# Patient Record
Sex: MALE | Race: WHITE | NOT HISPANIC OR LATINO | URBAN - METROPOLITAN AREA
[De-identification: names, ages, dates, MRNs, and addresses within clinical notes are randomized per-mention and may not be internally consistent; named-entity substitution may affect disease eponyms.]

---

## 2023-05-22 ENCOUNTER — INPATIENT (INPATIENT)
Facility: HOSPITAL | Age: 58
LOS: 1 days | Discharge: HOME | DRG: 552 | End: 2023-05-24
Attending: STUDENT IN AN ORGANIZED HEALTH CARE EDUCATION/TRAINING PROGRAM | Admitting: STUDENT IN AN ORGANIZED HEALTH CARE EDUCATION/TRAINING PROGRAM
Payer: COMMERCIAL

## 2023-05-22 VITALS
WEIGHT: 244.93 LBS | TEMPERATURE: 98 F | OXYGEN SATURATION: 94 % | DIASTOLIC BLOOD PRESSURE: 88 MMHG | HEART RATE: 117 BPM | HEIGHT: 70 IN | RESPIRATION RATE: 18 BRPM | SYSTOLIC BLOOD PRESSURE: 114 MMHG

## 2023-05-22 LAB
ALBUMIN SERPL ELPH-MCNC: 4.6 G/DL — SIGNIFICANT CHANGE UP (ref 3.3–5)
ALP SERPL-CCNC: 52 U/L — SIGNIFICANT CHANGE UP (ref 40–120)
ALT FLD-CCNC: 52 U/L — SIGNIFICANT CHANGE UP (ref 12–78)
ANION GAP SERPL CALC-SCNC: 8 MMOL/L — SIGNIFICANT CHANGE UP (ref 5–17)
AST SERPL-CCNC: 61 U/L — HIGH (ref 15–37)
BASOPHILS # BLD AUTO: 0.1 K/UL — SIGNIFICANT CHANGE UP (ref 0–0.2)
BASOPHILS NFR BLD AUTO: 0.8 % — SIGNIFICANT CHANGE UP (ref 0–2)
BILIRUB SERPL-MCNC: 0.7 MG/DL — SIGNIFICANT CHANGE UP (ref 0.2–1.2)
BUN SERPL-MCNC: 30 MG/DL — HIGH (ref 7–23)
CALCIUM SERPL-MCNC: 9.8 MG/DL — SIGNIFICANT CHANGE UP (ref 8.5–10.1)
CHLORIDE SERPL-SCNC: 102 MMOL/L — SIGNIFICANT CHANGE UP (ref 96–108)
CO2 SERPL-SCNC: 25 MMOL/L — SIGNIFICANT CHANGE UP (ref 22–31)
CREAT SERPL-MCNC: 1.2 MG/DL — SIGNIFICANT CHANGE UP (ref 0.5–1.3)
EGFR: 70 ML/MIN/1.73M2 — SIGNIFICANT CHANGE UP
EOSINOPHIL # BLD AUTO: 0.13 K/UL — SIGNIFICANT CHANGE UP (ref 0–0.5)
EOSINOPHIL NFR BLD AUTO: 1 % — SIGNIFICANT CHANGE UP (ref 0–6)
GLUCOSE SERPL-MCNC: 112 MG/DL — HIGH (ref 70–99)
HCT VFR BLD CALC: 40.9 % — SIGNIFICANT CHANGE UP (ref 39–50)
HGB BLD-MCNC: 13.9 G/DL — SIGNIFICANT CHANGE UP (ref 13–17)
IMM GRANULOCYTES NFR BLD AUTO: 0.2 % — SIGNIFICANT CHANGE UP (ref 0–0.9)
LYMPHOCYTES # BLD AUTO: 22.7 % — SIGNIFICANT CHANGE UP (ref 13–44)
LYMPHOCYTES # BLD AUTO: 3.02 K/UL — SIGNIFICANT CHANGE UP (ref 1–3.3)
MCHC RBC-ENTMCNC: 26.6 PG — LOW (ref 27–34)
MCHC RBC-ENTMCNC: 34 GM/DL — SIGNIFICANT CHANGE UP (ref 32–36)
MCV RBC AUTO: 78.2 FL — LOW (ref 80–100)
MONOCYTES # BLD AUTO: 0.86 K/UL — SIGNIFICANT CHANGE UP (ref 0–0.9)
MONOCYTES NFR BLD AUTO: 6.5 % — SIGNIFICANT CHANGE UP (ref 2–14)
NEUTROPHILS # BLD AUTO: 9.16 K/UL — HIGH (ref 1.8–7.4)
NEUTROPHILS NFR BLD AUTO: 68.8 % — SIGNIFICANT CHANGE UP (ref 43–77)
NRBC # BLD: 0 /100 WBCS — SIGNIFICANT CHANGE UP (ref 0–0)
PLATELET # BLD AUTO: 357 K/UL — SIGNIFICANT CHANGE UP (ref 150–400)
POTASSIUM SERPL-MCNC: 4.7 MMOL/L — SIGNIFICANT CHANGE UP (ref 3.5–5.3)
POTASSIUM SERPL-SCNC: 4.7 MMOL/L — SIGNIFICANT CHANGE UP (ref 3.5–5.3)
PROT SERPL-MCNC: 8.3 G/DL — SIGNIFICANT CHANGE UP (ref 6–8.3)
RBC # BLD: 5.23 M/UL — SIGNIFICANT CHANGE UP (ref 4.2–5.8)
RBC # FLD: 13.9 % — SIGNIFICANT CHANGE UP (ref 10.3–14.5)
SODIUM SERPL-SCNC: 135 MMOL/L — SIGNIFICANT CHANGE UP (ref 135–145)
WBC # BLD: 13.3 K/UL — HIGH (ref 3.8–10.5)
WBC # FLD AUTO: 13.3 K/UL — HIGH (ref 3.8–10.5)

## 2023-05-22 PROCEDURE — 99285 EMERGENCY DEPT VISIT HI MDM: CPT

## 2023-05-22 RX ORDER — MORPHINE SULFATE 50 MG/1
4 CAPSULE, EXTENDED RELEASE ORAL ONCE
Refills: 0 | Status: DISCONTINUED | OUTPATIENT
Start: 2023-05-22 | End: 2023-05-22

## 2023-05-22 RX ORDER — OXYCODONE HYDROCHLORIDE 5 MG/1
10 TABLET ORAL ONCE
Refills: 0 | Status: DISCONTINUED | OUTPATIENT
Start: 2023-05-22 | End: 2023-05-22

## 2023-05-22 RX ORDER — LIDOCAINE 4 G/100G
1 CREAM TOPICAL ONCE
Refills: 0 | Status: COMPLETED | OUTPATIENT
Start: 2023-05-22 | End: 2023-05-22

## 2023-05-22 RX ORDER — DEXAMETHASONE 0.5 MG/5ML
6 ELIXIR ORAL ONCE
Refills: 0 | Status: COMPLETED | OUTPATIENT
Start: 2023-05-22 | End: 2023-05-22

## 2023-05-22 RX ORDER — KETOROLAC TROMETHAMINE 30 MG/ML
30 SYRINGE (ML) INJECTION ONCE
Refills: 0 | Status: DISCONTINUED | OUTPATIENT
Start: 2023-05-22 | End: 2023-05-22

## 2023-05-22 RX ADMIN — MORPHINE SULFATE 4 MILLIGRAM(S): 50 CAPSULE, EXTENDED RELEASE ORAL at 22:43

## 2023-05-22 RX ADMIN — Medication 30 MILLIGRAM(S): at 22:42

## 2023-05-22 RX ADMIN — Medication 6 MILLIGRAM(S): at 22:42

## 2023-05-22 RX ADMIN — LIDOCAINE 1 PATCH: 4 CREAM TOPICAL at 20:40

## 2023-05-22 RX ADMIN — OXYCODONE HYDROCHLORIDE 10 MILLIGRAM(S): 5 TABLET ORAL at 20:39

## 2023-05-22 RX ADMIN — MORPHINE SULFATE 4 MILLIGRAM(S): 50 CAPSULE, EXTENDED RELEASE ORAL at 23:57

## 2023-05-22 NOTE — ED ADULT NURSE NOTE - ED COMFORT CARE
[facility]AN HOSPITALIST  History and Physical     Ty Holts Patient Status:  Observation    1951 MRN [id number]   [facility] 7NE-A Attending Chao Crowder MD   Hosp Day # 0 PCP Myriam Ash MD     Chief Complaint: dizziness • ENDOSCOPIC RETROGRADE CHOLANGIOPANCREATOGRAPHY (ERCP) N/A 8/8/2016    Performed by Alejandrina Walker MD at [address] [place] [address] (ERCP) N/A 7/25/2016    Performed by Alejandrina Walker MD at [facility] ENDOSCOPY   • E facility-administered medications on file prior to encounter.    ferrous sulfate 325 (65 FE) MG Oral Tab EC, Take 325 mg by mouth daily with breakfast., Disp: , Rfl:   pancrelipase, Lip-Prot-Amyl, 47709-64513 units Oral Cap DR Particles, Take 20,000 Units b deficits. CNII-XII grossly intact. Musculoskeletal: Moves all extremities. Extremities: No edema or cyanosis. Integument: No rashes or lesions. Psychiatric: Appropriate mood and affect.       Diagnostic Data:      Labs:  Recent Labs   Lab 11/12/19  125 Patient informed

## 2023-05-22 NOTE — ED PROVIDER NOTE - CLINICAL SUMMARY MEDICAL DECISION MAKING FREE TEXT BOX
58-year-old male with history of herniated disc L1-L5 history of spinal surgery seen and followed by spinal surgeon as well as pain management doctor Dr. Preston presents with worsening pain x2 weeks status post gardening.  Patient was at MRI office preparing for epidural injection unable to get MRI secondary to worsening pain.  Patient here in the ER lying on his right side complaining of right-sided buttock pain radiating down past the knee with tingling to the lateral aspect of his leg denies numbness bowel or urinary incontinence fever abdominal pain weakness.    On exam patient in no apparent distress only on movement patient with moderate distress, lungs clear to auscultation bilateral, cardiac tachycardic, abdomen soft nontender nondistended, back no midline spinal tenderness, positive tenderness to right buttock, unable to lift right leg secondary to pain, no sensory deficit,    Patient's clinical exam and history not consistent with spinal cord compression, patient with worsening disc pathology will treat for symptoms  Will give patient pain medication and reassess Will attempt to speak to Dr. Preston 58-year-old male with history of herniated disc L1-L5 history of spinal surgery seen and followed by spinal surgeon as well as pain management doctor Dr. Preston presents with worsening pain x2 weeks status post gardening.  Patient was at MRI office preparing for epidural injection unable to get MRI secondary to worsening pain.  Patient here in the ER lying on his right side complaining of right-sided buttock pain radiating down past the knee with tingling to the lateral aspect of his leg denies numbness bowel or urinary incontinence fever abdominal pain weakness.    On exam patient in no apparent distress only on movement patient with moderate distress, lungs clear to auscultation bilateral, cardiac tachycardic, abdomen soft nontender nondistended, back no midline spinal tenderness, positive tenderness to right buttock, unable to lift right leg secondary to pain, no sensory deficit,    Patient's clinical exam and history not consistent with spinal cord compression, patient with worsening disc pathology will treat for symptoms  Will give patient pain medication and reassess

## 2023-05-22 NOTE — ED ADULT TRIAGE NOTE - GLASGOW COMA SCALE: SCORE, MLM
Findings discussed with patient in detail. Pt will closely monitor symptoms for any change and understands the importance of prompt outpatient follow up and will return if worse.     15

## 2023-05-22 NOTE — ED ADULT NURSE NOTE - OBJECTIVE STATEMENT
Patient is a 59yo male Patient A/Ox4 with clear speech. BIBA for severe lower back pain. Hx of herniated discs, was picked up from imaging getting MRI of his back. Pain began when he was coming off the MRI table.

## 2023-05-22 NOTE — ED ADULT NURSE NOTE - NSFALLRISKINTERV_ED_ALL_ED
Assistance OOB with selected safe patient handling equipment if applicable/Assistance with ambulation/Communicate fall risk and risk factors to all staff, patient, and family/Monitor gait and stability/Provide visual cue: yellow wristband, yellow gown, etc/Reinforce activity limits and safety measures with patient and family/Call bell, personal items and telephone in reach/Instruct patient to call for assistance before getting out of bed/chair/stretcher/Non-slip footwear applied when patient is off stretcher/Withams to call system/Physically safe environment - no spills, clutter or unnecessary equipment/Purposeful Proactive Rounding/Room/bathroom lighting operational, light cord in reach

## 2023-05-22 NOTE — ED ADULT NURSE NOTE - NS ED NURSE LEVEL OF CONSCIOUSNESS MENTAL STATUS
Okay to use over-the-counter long-acting antihistamines or ibuprofen to decrease inflammation and redness.    Continue to monitor symptoms.  If you have any significant worsening, changes or concerns return at any time.    I hope that the rest of your bhavani year goes well!!!   Awake/Alert

## 2023-05-22 NOTE — ED ADULT TRIAGE NOTE - CHIEF COMPLAINT QUOTE
Patient A/Ox4 with clear speech. BIBA for severe lower back pain. Hx of herniated discs, was picked up from imaging getting MRI of his back. Pain began when he was coming off the MRI table.

## 2023-05-22 NOTE — ED PROVIDER NOTE - OBJECTIVE STATEMENT
58-year-old male with history of diabetes, hypertension, hyperlipidemia, and herniated disc, presents from the MRI office with excruciating back pain radiating down to right lower extremity past his knee.  Patient has been on gabapentin for several years for herniated disc past 2 weeks patient has had worsening pain since doing some gardening.  Patient was set to get MRI this afternoon for epidural placement by his pain management doctor Dr. Preston.  Patient unable to get MRI because of the excruciating pain, seen by his doctor at the MRI office.  Patient has been given amitriptyline prescription and also was told by his pain management doctor that he should get some oxycodone.  Patient sent here to [facility].  Patient denies abdominal pain, fever, chest pain, shortness of breath, patient says he has tongue tingling down his right lower extremity denies numbness in the prior perineal area in his extremities.  Patient also denies bowel and urinary incontinence.

## 2023-05-23 DIAGNOSIS — E78.5 HYPERLIPIDEMIA, UNSPECIFIED: ICD-10-CM

## 2023-05-23 DIAGNOSIS — I10 ESSENTIAL (PRIMARY) HYPERTENSION: ICD-10-CM

## 2023-05-23 DIAGNOSIS — M54.16 RADICULOPATHY, LUMBAR REGION: ICD-10-CM

## 2023-05-23 DIAGNOSIS — E11.9 TYPE 2 DIABETES MELLITUS WITHOUT COMPLICATIONS: ICD-10-CM

## 2023-05-23 DIAGNOSIS — Z29.9 ENCOUNTER FOR PROPHYLACTIC MEASURES, UNSPECIFIED: ICD-10-CM

## 2023-05-23 DIAGNOSIS — Z98.890 OTHER SPECIFIED POSTPROCEDURAL STATES: Chronic | ICD-10-CM

## 2023-05-23 LAB
ALBUMIN SERPL ELPH-MCNC: 4.9 G/DL — SIGNIFICANT CHANGE UP (ref 3.3–5)
ALP SERPL-CCNC: 55 U/L — SIGNIFICANT CHANGE UP (ref 40–120)
ALT FLD-CCNC: 49 U/L — SIGNIFICANT CHANGE UP (ref 12–78)
ANION GAP SERPL CALC-SCNC: 6 MMOL/L — SIGNIFICANT CHANGE UP (ref 5–17)
AST SERPL-CCNC: 38 U/L — HIGH (ref 15–37)
BILIRUB SERPL-MCNC: 0.8 MG/DL — SIGNIFICANT CHANGE UP (ref 0.2–1.2)
BUN SERPL-MCNC: 31 MG/DL — HIGH (ref 7–23)
CALCIUM SERPL-MCNC: 10.1 MG/DL — SIGNIFICANT CHANGE UP (ref 8.5–10.1)
CHLORIDE SERPL-SCNC: 102 MMOL/L — SIGNIFICANT CHANGE UP (ref 96–108)
CO2 SERPL-SCNC: 27 MMOL/L — SIGNIFICANT CHANGE UP (ref 22–31)
CREAT SERPL-MCNC: 1.2 MG/DL — SIGNIFICANT CHANGE UP (ref 0.5–1.3)
EGFR: 70 ML/MIN/1.73M2 — SIGNIFICANT CHANGE UP
GLUCOSE SERPL-MCNC: 208 MG/DL — HIGH (ref 70–99)
GLUCOSE SERPL-MCNC: 212 MG/DL — HIGH (ref 70–99)
HCT VFR BLD CALC: 42.5 % — SIGNIFICANT CHANGE UP (ref 39–50)
HGB BLD-MCNC: 14.1 G/DL — SIGNIFICANT CHANGE UP (ref 13–17)
MCHC RBC-ENTMCNC: 26.3 PG — LOW (ref 27–34)
MCHC RBC-ENTMCNC: 33.2 GM/DL — SIGNIFICANT CHANGE UP (ref 32–36)
MCV RBC AUTO: 79.1 FL — LOW (ref 80–100)
NRBC # BLD: 0 /100 WBCS — SIGNIFICANT CHANGE UP (ref 0–0)
PLATELET # BLD AUTO: 354 K/UL — SIGNIFICANT CHANGE UP (ref 150–400)
POTASSIUM SERPL-MCNC: 4.9 MMOL/L — SIGNIFICANT CHANGE UP (ref 3.5–5.3)
POTASSIUM SERPL-SCNC: 4.9 MMOL/L — SIGNIFICANT CHANGE UP (ref 3.5–5.3)
PROT SERPL-MCNC: 8.3 G/DL — SIGNIFICANT CHANGE UP (ref 6–8.3)
RBC # BLD: 5.37 M/UL — SIGNIFICANT CHANGE UP (ref 4.2–5.8)
RBC # FLD: 14 % — SIGNIFICANT CHANGE UP (ref 10.3–14.5)
SODIUM SERPL-SCNC: 135 MMOL/L — SIGNIFICANT CHANGE UP (ref 135–145)
WBC # BLD: 10.48 K/UL — SIGNIFICANT CHANGE UP (ref 3.8–10.5)
WBC # FLD AUTO: 10.48 K/UL — SIGNIFICANT CHANGE UP (ref 3.8–10.5)

## 2023-05-23 PROCEDURE — 99223 1ST HOSP IP/OBS HIGH 75: CPT | Mod: GC

## 2023-05-23 PROCEDURE — 12345: CPT | Mod: NC

## 2023-05-23 PROCEDURE — 72131 CT LUMBAR SPINE W/O DYE: CPT | Mod: 26

## 2023-05-23 PROCEDURE — 72148 MRI LUMBAR SPINE W/O DYE: CPT | Mod: 26

## 2023-05-23 RX ORDER — TIZANIDINE 4 MG/1
1 TABLET ORAL
Refills: 0 | DISCHARGE

## 2023-05-23 RX ORDER — ONDANSETRON 8 MG/1
4 TABLET, FILM COATED ORAL EVERY 8 HOURS
Refills: 0 | Status: DISCONTINUED | OUTPATIENT
Start: 2023-05-23 | End: 2023-05-24

## 2023-05-23 RX ORDER — HYDROMORPHONE HYDROCHLORIDE 2 MG/ML
1 INJECTION INTRAMUSCULAR; INTRAVENOUS; SUBCUTANEOUS EVERY 6 HOURS
Refills: 0 | Status: DISCONTINUED | OUTPATIENT
Start: 2023-05-23 | End: 2023-05-24

## 2023-05-23 RX ORDER — ACETAMINOPHEN 500 MG
650 TABLET ORAL EVERY 6 HOURS
Refills: 0 | Status: DISCONTINUED | OUTPATIENT
Start: 2023-05-23 | End: 2023-05-24

## 2023-05-23 RX ORDER — LANSOPRAZOLE 15 MG/1
1 CAPSULE, DELAYED RELEASE ORAL
Refills: 0 | DISCHARGE

## 2023-05-23 RX ORDER — DEXTROSE 50 % IN WATER 50 %
25 SYRINGE (ML) INTRAVENOUS ONCE
Refills: 0 | Status: DISCONTINUED | OUTPATIENT
Start: 2023-05-23 | End: 2023-05-24

## 2023-05-23 RX ORDER — GLUCAGON INJECTION, SOLUTION 0.5 MG/.1ML
1 INJECTION, SOLUTION SUBCUTANEOUS ONCE
Refills: 0 | Status: DISCONTINUED | OUTPATIENT
Start: 2023-05-23 | End: 2023-05-24

## 2023-05-23 RX ORDER — NIACIN 50 MG
1 TABLET ORAL
Refills: 0 | DISCHARGE

## 2023-05-23 RX ORDER — SODIUM CHLORIDE 9 MG/ML
1000 INJECTION, SOLUTION INTRAVENOUS
Refills: 0 | Status: DISCONTINUED | OUTPATIENT
Start: 2023-05-23 | End: 2023-05-24

## 2023-05-23 RX ORDER — POLYETHYLENE GLYCOL 3350 17 G/17G
17 POWDER, FOR SOLUTION ORAL
Refills: 0 | Status: DISCONTINUED | OUTPATIENT
Start: 2023-05-23 | End: 2023-05-24

## 2023-05-23 RX ORDER — HYDROMORPHONE HYDROCHLORIDE 2 MG/ML
1 INJECTION INTRAMUSCULAR; INTRAVENOUS; SUBCUTANEOUS ONCE
Refills: 0 | Status: DISCONTINUED | OUTPATIENT
Start: 2023-05-23 | End: 2023-05-23

## 2023-05-23 RX ORDER — GABAPENTIN 400 MG/1
600 CAPSULE ORAL ONCE
Refills: 0 | Status: COMPLETED | OUTPATIENT
Start: 2023-05-23 | End: 2023-05-23

## 2023-05-23 RX ORDER — DEXTROSE 50 % IN WATER 50 %
12.5 SYRINGE (ML) INTRAVENOUS ONCE
Refills: 0 | Status: DISCONTINUED | OUTPATIENT
Start: 2023-05-23 | End: 2023-05-24

## 2023-05-23 RX ORDER — ICOSAPENT ETHYL 500 MG/1
2 CAPSULE, LIQUID FILLED ORAL
Refills: 0 | DISCHARGE

## 2023-05-23 RX ORDER — GABAPENTIN 400 MG/1
600 CAPSULE ORAL
Refills: 0 | Status: DISCONTINUED | OUTPATIENT
Start: 2023-05-23 | End: 2023-05-24

## 2023-05-23 RX ORDER — HYDROMORPHONE HYDROCHLORIDE 2 MG/ML
0.5 INJECTION INTRAMUSCULAR; INTRAVENOUS; SUBCUTANEOUS EVERY 6 HOURS
Refills: 0 | Status: DISCONTINUED | OUTPATIENT
Start: 2023-05-23 | End: 2023-05-24

## 2023-05-23 RX ORDER — TELMISARTAN AND HYDROCHLOROTHIAZIDE 40; 12.5 MG/1; MG/1
1 TABLET ORAL
Refills: 0 | DISCHARGE

## 2023-05-23 RX ORDER — SIMVASTATIN 20 MG/1
40 TABLET, FILM COATED ORAL AT BEDTIME
Refills: 0 | Status: DISCONTINUED | OUTPATIENT
Start: 2023-05-23 | End: 2023-05-24

## 2023-05-23 RX ORDER — PANTOPRAZOLE SODIUM 20 MG/1
40 TABLET, DELAYED RELEASE ORAL
Refills: 0 | Status: DISCONTINUED | OUTPATIENT
Start: 2023-05-23 | End: 2023-05-24

## 2023-05-23 RX ORDER — SITAGLIPTIN AND METFORMIN HYDROCHLORIDE 500; 50 MG/1; MG/1
1 TABLET, FILM COATED ORAL
Refills: 0 | DISCHARGE

## 2023-05-23 RX ORDER — DEXTROSE 50 % IN WATER 50 %
15 SYRINGE (ML) INTRAVENOUS ONCE
Refills: 0 | Status: DISCONTINUED | OUTPATIENT
Start: 2023-05-23 | End: 2023-05-24

## 2023-05-23 RX ORDER — LIDOCAINE 4 G/100G
1 CREAM TOPICAL EVERY 24 HOURS
Refills: 0 | Status: DISCONTINUED | OUTPATIENT
Start: 2023-05-23 | End: 2023-05-23

## 2023-05-23 RX ORDER — LOSARTAN POTASSIUM 100 MG/1
100 TABLET, FILM COATED ORAL DAILY
Refills: 0 | Status: DISCONTINUED | OUTPATIENT
Start: 2023-05-23 | End: 2023-05-24

## 2023-05-23 RX ORDER — INSULIN LISPRO 100/ML
VIAL (ML) SUBCUTANEOUS
Refills: 0 | Status: DISCONTINUED | OUTPATIENT
Start: 2023-05-23 | End: 2023-05-24

## 2023-05-23 RX ORDER — GABAPENTIN 400 MG/1
1 CAPSULE ORAL
Refills: 0 | DISCHARGE

## 2023-05-23 RX ORDER — EZETIMIBE 10 MG/1
1 TABLET ORAL
Refills: 0 | DISCHARGE

## 2023-05-23 RX ORDER — SIMVASTATIN 20 MG/1
1 TABLET, FILM COATED ORAL
Refills: 0 | DISCHARGE

## 2023-05-23 RX ORDER — SENNA PLUS 8.6 MG/1
2 TABLET ORAL AT BEDTIME
Refills: 0 | Status: DISCONTINUED | OUTPATIENT
Start: 2023-05-23 | End: 2023-05-24

## 2023-05-23 RX ORDER — LIDOCAINE 4 G/100G
1 CREAM TOPICAL
Refills: 0 | Status: DISCONTINUED | OUTPATIENT
Start: 2023-05-23 | End: 2023-05-24

## 2023-05-23 RX ORDER — NIACIN 50 MG
500 TABLET ORAL AT BEDTIME
Refills: 0 | Status: DISCONTINUED | OUTPATIENT
Start: 2023-05-23 | End: 2023-05-24

## 2023-05-23 RX ORDER — LANOLIN ALCOHOL/MO/W.PET/CERES
3 CREAM (GRAM) TOPICAL AT BEDTIME
Refills: 0 | Status: DISCONTINUED | OUTPATIENT
Start: 2023-05-23 | End: 2023-05-24

## 2023-05-23 RX ORDER — INSULIN LISPRO 100/ML
VIAL (ML) SUBCUTANEOUS AT BEDTIME
Refills: 0 | Status: DISCONTINUED | OUTPATIENT
Start: 2023-05-23 | End: 2023-05-24

## 2023-05-23 RX ADMIN — PANTOPRAZOLE SODIUM 40 MILLIGRAM(S): 20 TABLET, DELAYED RELEASE ORAL at 06:52

## 2023-05-23 RX ADMIN — HYDROMORPHONE HYDROCHLORIDE 0.5 MILLIGRAM(S): 2 INJECTION INTRAMUSCULAR; INTRAVENOUS; SUBCUTANEOUS at 08:25

## 2023-05-23 RX ADMIN — Medication 1: at 07:45

## 2023-05-23 RX ADMIN — GABAPENTIN 600 MILLIGRAM(S): 400 CAPSULE ORAL at 06:52

## 2023-05-23 RX ADMIN — HYDROMORPHONE HYDROCHLORIDE 0.5 MILLIGRAM(S): 2 INJECTION INTRAMUSCULAR; INTRAVENOUS; SUBCUTANEOUS at 08:40

## 2023-05-23 RX ADMIN — OXYCODONE HYDROCHLORIDE 10 MILLIGRAM(S): 5 TABLET ORAL at 01:46

## 2023-05-23 RX ADMIN — Medication 500 MILLIGRAM(S): at 21:34

## 2023-05-23 RX ADMIN — HYDROMORPHONE HYDROCHLORIDE 1 MILLIGRAM(S): 2 INJECTION INTRAMUSCULAR; INTRAVENOUS; SUBCUTANEOUS at 05:10

## 2023-05-23 RX ADMIN — Medication 30 MILLIGRAM(S): at 01:46

## 2023-05-23 RX ADMIN — HYDROMORPHONE HYDROCHLORIDE 1 MILLIGRAM(S): 2 INJECTION INTRAMUSCULAR; INTRAVENOUS; SUBCUTANEOUS at 06:38

## 2023-05-23 RX ADMIN — MORPHINE SULFATE 4 MILLIGRAM(S): 50 CAPSULE, EXTENDED RELEASE ORAL at 01:46

## 2023-05-23 RX ADMIN — Medication 1: at 17:38

## 2023-05-23 RX ADMIN — GABAPENTIN 600 MILLIGRAM(S): 400 CAPSULE ORAL at 12:26

## 2023-05-23 RX ADMIN — SIMVASTATIN 40 MILLIGRAM(S): 20 TABLET, FILM COATED ORAL at 21:34

## 2023-05-23 RX ADMIN — GABAPENTIN 600 MILLIGRAM(S): 400 CAPSULE ORAL at 02:12

## 2023-05-23 RX ADMIN — POLYETHYLENE GLYCOL 3350 17 GRAM(S): 17 POWDER, FOR SOLUTION ORAL at 17:38

## 2023-05-23 RX ADMIN — HYDROMORPHONE HYDROCHLORIDE 1 MILLIGRAM(S): 2 INJECTION INTRAMUSCULAR; INTRAVENOUS; SUBCUTANEOUS at 06:39

## 2023-05-23 RX ADMIN — POLYETHYLENE GLYCOL 3350 17 GRAM(S): 17 POWDER, FOR SOLUTION ORAL at 06:52

## 2023-05-23 RX ADMIN — LOSARTAN POTASSIUM 100 MILLIGRAM(S): 100 TABLET, FILM COATED ORAL at 06:52

## 2023-05-23 RX ADMIN — GABAPENTIN 600 MILLIGRAM(S): 400 CAPSULE ORAL at 17:38

## 2023-05-23 RX ADMIN — Medication 1: at 12:26

## 2023-05-23 RX ADMIN — HYDROMORPHONE HYDROCHLORIDE 1 MILLIGRAM(S): 2 INJECTION INTRAMUSCULAR; INTRAVENOUS; SUBCUTANEOUS at 02:12

## 2023-05-23 NOTE — H&P ADULT - ATTENDING COMMENTS
59yo M w/pmhx diabetes, hypertension, hyperlipidemia, herniated disc, presents from MRI office with severe back/buttocks pain radiating to right lower extremity past his knee. Admitted for pain management of lumbar radiculopathy.    Agree with above. Edited where appropriate

## 2023-05-23 NOTE — H&P ADULT - NSHPPHYSICALEXAM_GEN_ALL_CORE
[de-identified] : TAMIKA HARVEY is a 58 year old male with h/o JANE here to establish medical care.  Last CPE 1 year ago, reportedly normal. Transitioning primary care due to his previous provider no longer as accessible to his phone calls/requests to be seen.  He reports being in good general health - currently denies chest pain, SOB, dizziness, weakness, palpitations, lightheadedness or syncope\par \par PMH\par 1.  JANE - post- 9/11, takes Paroxetine, under the care of psychiatrist Dr. Rosy Byers -[phone]\par 2.  He c/o burning sensation left lateral lower extremity x several months when he kneels down at [employer]\par \par FHx: HTN (Mother), HLD (Mother), Stroke (Father at 89)\par \par PSHx: appendectomy,  rectal surgery for hemorrhoids resulting in complications, Dr. 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.\par - Exercise: stretching, punch bags\par \par HCM:\par Colonoscopy: due 2022\par Immunizations:  Flu: 1/2018 Tdap: DUE, defers today T(C): 36.5 (05-22-23 @ 22:06), Max: 36.5 (05-22-23 @ 17:46)  HR: 62 (05-22-23 @ 22:06) (62 - 117)  BP: 103/71 (05-22-23 @ 22:06) (103/71 - 114/88)  RR: 15 (05-22-23 @ 22:06) (15 - 18)  SpO2: 100% (05-22-23 @ 22:06) (94% - 100%)    GENERAL: No acute distress, in R lateral recumbent position with legs bent  EYES: sclera clear, no exudates  ENMT: oropharynx clear without erythema, no exudates, moist mucous membranes  NECK: supple, soft  LUNGS: good air entry bilaterally, clear to auscultation, symmetric breath sounds, no wheezing or rhonchi appreciated  HEART: soft S1/S2, regular rate and rhythm, no murmurs noted, no lower extremity edema  GASTROINTESTINAL: abdomen is soft, nontender, nondistended, normoactive bowel sounds, no palpable masses  INTEGUMENT: good skin turgor, no lesions noted  MUSCULOSKELETAL: no clubbing or cyanosis, no obvious deformity. No TTP of back or spine, +TTP of R buttocks  NEUROLOGIC: awake, alert, oriented x3, good muscle tone in 4 extremities, no obvious sensory deficits  PSYCHIATRIC: mood is good, affect is congruent, linear and logical thought process  HEME/LYMPH: no obvious ecchymosis or petechiae T(C): 36.5 (05-22-23 @ 22:06), Max: 36.5 (05-22-23 @ 17:46)  HR: 62 (05-22-23 @ 22:06) (62 - 117)  BP: 103/71 (05-22-23 @ 22:06) (103/71 - 114/88)  RR: 15 (05-22-23 @ 22:06) (15 - 18)  SpO2: 100% (05-22-23 @ 22:06) (94% - 100%)  GENERAL: No acute distress, in R lateral recumbent position with legs bent  EYES: sclera clear, no exudates  ENMT: oropharynx clear without erythema, no exudates, moist mucous membranes  NECK: supple, soft  LUNGS: good air entry bilaterally, clear to auscultation, symmetric breath sounds, no wheezing or rhonchi appreciated  HEART: soft S1/S2, regular rate and rhythm, no murmurs noted, no lower extremity edema  GASTROINTESTINAL: abdomen is soft, nontender, nondistended, normoactive bowel sounds, no palpable masses  INTEGUMENT: good skin turgor, no lesions noted  MUSCULOSKELETAL: no clubbing or cyanosis, no obvious deformity. No TTP of back or spine, +TTP of R buttocks  NEUROLOGIC: awake, alert, oriented x3, good muscle tone in 4 extremities, no obvious sensory deficits  PSYCHIATRIC: mood is good, affect is congruent, linear and logical thought process  HEME/LYMPH: no obvious ecchymosis or petechiae

## 2023-05-23 NOTE — PHYSICAL THERAPY INITIAL EVALUATION ADULT - ADDITIONAL COMMENTS
Pt lives w/ his spouse and 3 sons in a house, + steps. pt was an independent ambulator without device- used SC as needed and ind w/ ADLs. +driving

## 2023-05-23 NOTE — PATIENT PROFILE ADULT - FUNCTIONAL ASSESSMENT - BASIC MOBILITY 6.
2-calculated by average/Not able to assess (calculate score using [facility] averaging method)

## 2023-05-23 NOTE — PHYSICAL THERAPY INITIAL EVALUATION ADULT - PERTINENT HX OF CURRENT PROBLEM, REHAB EVAL
59yo M adm 5/23 w/pmhx diabetes, hypertension, hyperlipidemia, herniated disc, presents from MRI office with severe back/buttocks pain radiating to right lower extremity past his knee. Admitted for pain management of lumbar radiculopathy.

## 2023-05-23 NOTE — H&P ADULT - NSHPREVIEWOFSYSTEMS_GEN_ALL_CORE
CONSTITUTIONAL: denies fever, chills, fatigue, weakness  HEENT: denies blurred vision, sore throat  SKIN: denies new lesions, rash  CARDIOVASCULAR: denies chest pain, chest pressure, palpitations  RESPIRATORY: denies shortness of breath, sputum production  GASTROINTESTINAL: denies nausea, vomiting, diarrhea, abdominal pain  GENITOURINARY: denies dysuria, discharge  NEUROLOGICAL: denies numbness, headache, focal weakness  MUSCULOSKELETAL: denies new joint pain, muscle aches, ++buttocks/R leg pain  HEMATOLOGIC: denies gross bleeding, bruising  LYMPHATICS: denies enlarged lymph nodes, extremity swelling  PSYCHIATRIC: denies recent changes in anxiety, depression  ENDOCRINOLOGIC: denies sweating, cold or heat intolerance

## 2023-05-23 NOTE — CONSULT NOTE ADULT - SUBJECTIVE AND OBJECTIVE BOX
HPI: Decision made to obtain and review patient’s current hospital records, which are summarized as follows.   59yo M w/pmhx diabetes, hypertension, hyperlipidemia, herniated disc L1-L5, presents from MRI office with severe back/buttocks pain radiating to right lower extremity past his knee. Patient has been on gabapentin for several years due to herniated disc. Reports pain of his right buttocks that radiates down his right leg that began to worsen on May 1st. On May 13th he was gardening and since then his pain has been unbearable. He has taken tramadol, tizanidine, aspirin, tylenol, and his normal dose of gabapentin to try to control the pain, however he has been nearly bed bound the last 1.5 weeks, only getting up to go to the bathroom. This afternoon he was to go for an MRI for epidural placement, however he was unable to get it because of the pain and was sent to the ED. Pain while lying down on his right side is a 5-6/10, throbbing and tingling. If he moves, it is a sharp pain that radiates down, wrapping around his thigh and to his groin and becomes a 7-8. Reports he took some left over percocet a week ago, and he had difficulty urinating that day, however after he stopped taking the percocet he urinated normally. Denies any loss of bowel or urinary function.    ED Course:  Vitals: T 97.7, , /88, RR 18, spo2 94% on RA  Labs significant for: WBC 13.30, MCV 78.2, BUN 30, AST 61  Given Decadron 6mg IVP x1, Ketorolac 30mg IVP x1, lidocaine patch x1, morphine 4mg IVP x2, oxycodone 10mg PO x1, Dilaudid 1mg IVP x1, gabapentin 600mg x1 in the ED (23 May 2023 01:32)    Radiological studies reviewed.  < from: MR Lumbar Spine No Cont (05.23.23 @ 09:13) >  IMPRESSION:  Mild to moderate disc bulge L3-4. Abnormal soft tissue within the right   lateral recess extending cranially behind the L3 vertebral body into the   right L3-4 neural foramina. Favor extruded/sequestered disc fragment.   Fragment measures approximately 0.8 x 1.3 x 1.6 cm.  Prior left L5 laminectomy. Broad left paracentral/foraminal disc   protrusion L5-S1 with an associated osteophyte. Moderate facet DJD. Mass   effect on the left S1 nerve root within the left lateral recess. Moderate   to severe left neural foramina narrowing    < end of copied text >    Medical studies/laboratory studies reviewed.    Current Medications reviewed.  MEDICATIONS  (STANDING):  dextrose 5%. 1000 milliLiter(s) (50 mL/Hr) IV Continuous <Continuous>  dextrose 5%. 1000 milliLiter(s) (100 mL/Hr) IV Continuous <Continuous>  dextrose 50% Injectable 25 Gram(s) IV Push once  dextrose 50% Injectable 12.5 Gram(s) IV Push once  dextrose 50% Injectable 25 Gram(s) IV Push once  gabapentin 600 milliGRAM(s) Oral four times a day  glucagon  Injectable 1 milliGRAM(s) IntraMuscular once  hydrochlorothiazide 12.5 milliGRAM(s) Oral daily  insulin lispro (ADMELOG) corrective regimen sliding scale   SubCutaneous three times a day before meals  insulin lispro (ADMELOG) corrective regimen sliding scale   SubCutaneous at bedtime  lidocaine   4% Patch 1 Patch Transdermal <User Schedule>  losartan 100 milliGRAM(s) Oral daily  niacin  milliGRAM(s) Oral at bedtime  pantoprazole    Tablet 40 milliGRAM(s) Oral before breakfast  polyethylene glycol 3350 17 Gram(s) Oral two times a day  senna 2 Tablet(s) Oral at bedtime  simvastatin 40 milliGRAM(s) Oral at bedtime    MEDICATIONS  (PRN):  acetaminophen     Tablet .. 650 milliGRAM(s) Oral every 6 hours PRN Temp greater or equal to 38C (100.4F), Mild Pain (1 - 3)  aluminum hydroxide/magnesium hydroxide/simethicone Suspension 30 milliLiter(s) Oral every 4 hours PRN Dyspepsia  dextrose Oral Gel 15 Gram(s) Oral once PRN Blood Glucose LESS THAN 70 milliGRAM(s)/deciliter  HYDROmorphone  Injectable 1 milliGRAM(s) IV Push every 6 hours PRN Severe Pain (7 - 10)  HYDROmorphone  Injectable 0.5 milliGRAM(s) IV Push every 6 hours PRN Moderate Pain (4 - 6)  melatonin 3 milliGRAM(s) Oral at bedtime PRN Insomnia  ondansetron Injectable 4 milliGRAM(s) IV Push every 8 hours PRN Nausea and/or Vomiting    The patient was seen and examined at bedside.    ROS:  Constitutional: Denies fevers or chills  Eyes: Denies blurry vision or double vision  Ears/Nose/Mouth/Throat: Denies any pain on swallowing or dry mouth or runny nose  CV: Denies chest pain or palpitations  Respiratory: Denies cough or dyspnea  GI: Denies nausea, vomiting, abdominal pain  : Denies urinary frequency or dysuria  MSK: Denies any myalgia or arthralgia  Neuro: Denies any headache or dizziness  Psychiatric: Denies depression or anxiety    PMHx: As above in HPI  Social Hx: No history of alcohol, tobacco, or illicit drug use.  Family History: Family history reviewed and found to be non pertinent to patients current disposition.    Physical Exam:     Constitutional: Gen: In no acute distress, cooperative with exam and questioning   Eyes: PERRL, no erythema of conjunctivae  Neck: No midline tenderness to palpation, supple  Ears/Nose/Mouth/Throat: Mucous membranes moist, no thrush, no rhinorrhea  CV: Regular rate and rhythm, S1 S2, no peripheral edema, pedal pulses intact  Resp: Good respiratory effort, Good air movement all lung fields  GI: Nontender, normoactive bowel sounds  Neuro: Cranial Nerves II-XII intact, sensation diminished to right L3/4 dermatome  Skin: No rashes, normal temperature on palpation  Psychiatric: Awake alert fully oriented    MSK:   Lumbar Spine Exam:  Inspection:  no erythema, no edema   Palpation:  SIJ tenderness is negative, there is muscle spasm and tenderness to palpation across lumbar paraspinals bilaterally  Range of Motion:  decreased range of motion of lumbar spine secondary to pain   Power:  motor exam 5/5 throughout LE  Sensation:  sensation is diminished to L5/S1  Reflexes:  DTR’s= symmetric in LE  Special Tests:  Straight leg raising test is negative bilaterally ,Berhane/TORRES maneuver is negative, Negative Facet loading, Clonus negative      HPI: Decision made to obtain and review patient’s current hospital records, which are summarized as follows.   59yo M w/pmhx diabetes, hypertension, hyperlipidemia, herniated disc L1-L5, presents from MRI office with severe back/buttocks pain radiating to right lower extremity past his knee. Patient has been on gabapentin for several years due to herniated disc. Reports pain of his right buttocks that wraps around the front of his thigh and into his groin region.  It is associated with significant numbness/tingling/ burning pain.  He was previously on gabapentin 400 mg q daily however now is up to 600 mg q 6 hours since coming here- denies sedation.  [facility]  Patient is followed by Dr. Quintana- history of lumbar diskectomies and laminectomy.  Patient also follows with Dr. Preston outpatient pain management and had great relief over a year ago with 2 epidural steroid injections.  He was scheduled for a repeat injection this Friday however his pain was too intense.  Pain has improved after ED course as below.  He states dilaudid IV helps most significantly. Steroids have since been discontinued.  Patient denies weakness.     ED Course:  Vitals: T 97.7, , /88, RR 18, spo2 94% on RA  Labs significant for: WBC 13.30, MCV 78.2, BUN 30, AST 61  Given Decadron 6mg IVP x1, Ketorolac 30mg IVP x1, lidocaine patch x1, morphine 4mg IVP x2, oxycodone 10mg PO x1, Dilaudid 1mg IVP x1, gabapentin 600mg x1 in the ED (23 May 2023 01:32)    Radiological studies reviewed.  < from: MR Lumbar Spine No Cont (05.23.23 @ 09:13) >  IMPRESSION:  Mild to moderate disc bulge L3-4. Abnormal soft tissue within the right   lateral recess extending cranially behind the L3 vertebral body into the   right L3-4 neural foramina. Favor extruded/sequestered disc fragment.   Fragment measures approximately 0.8 x 1.3 x 1.6 cm.  Prior left L5 laminectomy. Broad left paracentral/foraminal disc   protrusion L5-S1 with an associated osteophyte. Moderate facet DJD. Mass   effect on the left S1 nerve root within the left lateral recess. Moderate   to severe left neural foramina narrowing    < end of copied text >    Medical studies/laboratory studies reviewed.    Current Medications reviewed.  MEDICATIONS  (STANDING):  dextrose 5%. 1000 milliLiter(s) (50 mL/Hr) IV Continuous <Continuous>  dextrose 5%. 1000 milliLiter(s) (100 mL/Hr) IV Continuous <Continuous>  dextrose 50% Injectable 25 Gram(s) IV Push once  dextrose 50% Injectable 12.5 Gram(s) IV Push once  dextrose 50% Injectable 25 Gram(s) IV Push once  gabapentin 600 milliGRAM(s) Oral four times a day  glucagon  Injectable 1 milliGRAM(s) IntraMuscular once  hydrochlorothiazide 12.5 milliGRAM(s) Oral daily  insulin lispro (ADMELOG) corrective regimen sliding scale   SubCutaneous three times a day before meals  insulin lispro (ADMELOG) corrective regimen sliding scale   SubCutaneous at bedtime  lidocaine   4% Patch 1 Patch Transdermal <User Schedule>  losartan 100 milliGRAM(s) Oral daily  niacin  milliGRAM(s) Oral at bedtime  pantoprazole    Tablet 40 milliGRAM(s) Oral before breakfast  polyethylene glycol 3350 17 Gram(s) Oral two times a day  senna 2 Tablet(s) Oral at bedtime  simvastatin 40 milliGRAM(s) Oral at bedtime    MEDICATIONS  (PRN):  acetaminophen     Tablet .. 650 milliGRAM(s) Oral every 6 hours PRN Temp greater or equal to 38C (100.4F), Mild Pain (1 - 3)  aluminum hydroxide/magnesium hydroxide/simethicone Suspension 30 milliLiter(s) Oral every 4 hours PRN Dyspepsia  dextrose Oral Gel 15 Gram(s) Oral once PRN Blood Glucose LESS THAN 70 milliGRAM(s)/deciliter  HYDROmorphone  Injectable 1 milliGRAM(s) IV Push every 6 hours PRN Severe Pain (7 - 10)  HYDROmorphone  Injectable 0.5 milliGRAM(s) IV Push every 6 hours PRN Moderate Pain (4 - 6)  melatonin 3 milliGRAM(s) Oral at bedtime PRN Insomnia  ondansetron Injectable 4 milliGRAM(s) IV Push every 8 hours PRN Nausea and/or Vomiting    The patient was seen and examined at bedside.    ROS:  Constitutional: Denies fevers or chills  Eyes: Denies blurry vision or double vision  Ears/Nose/Mouth/Throat: Denies any pain on swallowing or dry mouth or runny nose  CV: Denies chest pain or palpitations  Respiratory: Denies cough or dyspnea  GI: Denies nausea, vomiting, abdominal pain  : Denies urinary frequency or dysuria  MSK: admits to low back pain  Neuro: Denies any headache or dizziness  Psychiatric: Denies depression or anxiety    PMHx: As above in HPI  Social Hx: No history of alcohol, tobacco, or illicit drug use.  Family History: Family history reviewed and found to be non pertinent to patients current disposition.    Physical Exam:     Constitutional: Gen: In no acute distress, cooperative with exam and questioning   Eyes: PERRL, no erythema of conjunctivae  Neck: No midline tenderness to palpation, supple  Ears/Nose/Mouth/Throat: Mucous membranes moist, no thrush, no rhinorrhea  CV: Regular rate and rhythm, S1 S2, no peripheral edema, pedal pulses intact  Resp: Good respiratory effort, Good air movement all lung fields  GI: Nontender, normoactive bowel sounds  Neuro: Cranial Nerves II-XII intact, sensation diminished to right L3/4 dermatome  Skin: No rashes, normal temperature on palpation  Psychiatric: Awake alert fully oriented    MSK:   Lumbar Spine Exam:  Inspection:  no erythema, no edema   Palpation:  SIJ tenderness is negative, there is muscle spasm and tenderness to palpation across lumbar paraspinals on right  Range of Motion:  decreased range of motion of lumbar spine secondary to pain   Power:  motor exam 5/5 throughout LE  Sensation:  sensation is diminished to L3/4 on right  Reflexes:  DTR’s= symmetric in LE  Special Tests:  Straight leg raising test is positive on right. negative on left. ,Berhane/TORRES maneuver is negative, Negative Facet loading, Clonus negative

## 2023-05-23 NOTE — CONSULT NOTE ADULT - ASSESSMENT
Assessment & Plan:  58 year old male presents with lumbar pain and radiculopathy most likely impacting right L3/4 nerve roots.  Patient with extruded disc material seen on MRI.  Given no weakness or neurological deficits on exam, there is no immediate need for surgical intervention.  Pain has improved with dilaudid IV and increase in gabapentin.       PT: Focus on gait training, sensorimotor skills, increase body awareness, improve lumbar range of motion, strengthening, endurance training, neuromuscular training and control.    #Lumbar radiculopathy/ Low back Pain/ Lumbar disc extrusion at L3/4- Tylenol 1000 mg q 8 hours PRN for mild pain (1-3)  Dilaudid 0.5 mg q 6 hours PRN for moderate pain (4-6)  Dilaudid 1 mg q 6 hours PRN for severe pain (7-10)  Upon discharge would recommend Oxycodone 10 mg BID PRN for 1-2 weeks for exacerbation of pain.   Gabapentin 600 mg q 6 hours for radicular symptoms.  Would consider Diclofenac 50 mg BID (creatinine 1.2) for 1-2 weeks if cleared by Dr. Preston as patient scheduled to undergo lumbar epidural this upcoming friday- which I recommend. Patient will call Dr. Preston for clearance of NSAID tomorrow.  Patient would most likely benefit from Lumbar epidural steroid injection at L3/4 right paramedian approach.  If no significant relief, he should follow up with his surgeon.    Recommend PT program as above.   Avoid steroids at this time given history of diabetes and patient scheduled to undergo steroid injection on Friday.    Prophylaxis:   DVT prophylaxis per primary team    Discussed plan of care at length with patient and patient's wife at bedside.        Gamal Ryan D.O.  Board Certified Physical Medicine & Rehabilitation and Interventional Pain Physician

## 2023-05-23 NOTE — H&P ADULT - PROBLEM SELECTOR PLAN 4
Chronic, glucose WNL on admission  - Consistent carb diet   - Hold home po diabetic medications  - Start low dose correctional insulin scale    - Goal blood glucose of 140-180, adjust insulin regimen PRN  - Hypoglycemia protocol in place, monitor for signs of hypoglycemia

## 2023-05-23 NOTE — CONSULT NOTE ADULT - SUBJECTIVE AND OBJECTIVE BOX
Patient is a 58y Male who presents c/o intermittent lower back pain radiating down to right ankle x1 year, worse over the past 2 weeks. Community ambulator w/o assistance at baseline. Hx of Lsp Discectomy w/ Dr. Quintana in 2007 for Left sided Sciatica. Pt states his pain has been persistent for the past 2 weeks, worse w/ ambulation, no pain at rest. Pt has seen Dr. Quintana (Spine) for sx, and has been treated w/ conservative management. MRI scheduled yesterday, but was unable to go to MRI secondary to pain. Has been seen by Dr. Preston (Pain Management), received epidural injection on March 25th and April 15th which resolved sx, but sx returned 2 weeks ago. Pt has appointment w/ Dr. Quintana on June 1st. No Bowel/bladder incontinence. Does NOT feel off balance when walking. No Trauma or falls. Denies Numbness, weakness, CP, SOB, fever, chills or any other complaints.     HEALTH ISSUES - PROBLEM Dx:  Type 2 diabetes mellitus    HTN (hypertension)    HLD (hyperlipidemia)    Need for prophylactic measure    Acute lumbar radiculopathy    MEDICATIONS  (STANDING):  dextrose 5%. 1000 milliLiter(s) IV Continuous <Continuous>  dextrose 5%. 1000 milliLiter(s) IV Continuous <Continuous>  dextrose 50% Injectable 25 Gram(s) IV Push once  dextrose 50% Injectable 12.5 Gram(s) IV Push once  dextrose 50% Injectable 25 Gram(s) IV Push once  gabapentin 600 milliGRAM(s) Oral four times a day  glucagon  Injectable 1 milliGRAM(s) IntraMuscular once  hydrochlorothiazide 12.5 milliGRAM(s) Oral daily  insulin lispro (ADMELOG) corrective regimen sliding scale   SubCutaneous three times a day before meals  insulin lispro (ADMELOG) corrective regimen sliding scale   SubCutaneous at bedtime  lidocaine   4% Patch 1 Patch Transdermal <User Schedule>  losartan 100 milliGRAM(s) Oral daily  niacin  milliGRAM(s) Oral at bedtime  pantoprazole    Tablet 40 milliGRAM(s) Oral before breakfast  polyethylene glycol 3350 17 Gram(s) Oral two times a day  senna 2 Tablet(s) Oral at bedtime  simvastatin 40 milliGRAM(s) Oral at bedtime      Allergies    No Known Allergies    Intolerances        PAST MEDICAL & SURGICAL HISTORY:  Type 2 diabetes mellitus    HTN (hypertension)    HLD (hyperlipidemia)    Lumbar herniated disc    S/P diskectomy                              14.1   10.48 )-----------( 354      ( 23 May 2023 05:07 )             42.5       23 May 2023 05:07    135    |  102    |  31     ----------------------------<  208    4.9     |  27     |  1.20     Ca    10.1       23 May 2023 05:07    TPro  8.3    /  Alb  4.9    /  TBili  0.8    /  DBili  x      /  AST  38     /  ALT  49     /  AlkPhos  55     23 May 2023 05:07    Vital Signs Last 24 Hrs  T(C): 36.5 (05-22-23 @ 22:06), Max: 36.5 (05-22-23 @ 17:46)  T(F): 97.7 (05-22-23 @ 22:06), Max: 97.7 (05-22-23 @ 17:46)  HR: 62 (05-22-23 @ 22:06) (62 - 117)  BP: 103/71 (05-22-23 @ 22:06) (103/71 - 114/88)  BP(mean): --  RR: 15 (05-22-23 @ 22:06) (15 - 18)  SpO2: 100% (05-22-23 @ 22:06) (94% - 100%)    Physical Exam:   Gen: NAD  Spine PE:  Skin intact  No gross deformity  No midnline TTP C/T/L/S spine  No bony step offs  No paraspinal muscle ttp/hypertonicity   Negative clonus  Negative babinski  Negative rodriguez    Motor:                   C5                C6              C7               C8           T1   R            5/5                5/5            5/5             5/5          5/5  L             5/5               5/5             5/5             5/5          5/5                  L2             L3             L4               L5            S1  R         5/5           5/5          5/5             5/5           5/5  L          5/5          5/5           5/5             5/5           5/5    Sensory:            C5         C6         C7      C8       T1        (0=absent, 1=impaired, 2=normal, NT=not testable)  R        2          2              2        2         2  L         2          2              2        2         2               L2          L3         L4      L5       S1         (0=absent, 1=impaired, 2=normal, NT=not testable)  R          2          2              2        2         2  L          2          2              2        2         2    Imaging:   CT/MRI Pending.     A/P: 58y Male with Lsp Radiculopathy.     FU CT Lsp/MRI Lsp  Pain control prn  Recommend IV Decadron, Gabapentin, Flexeril  WBAT with assistive devices as needed, PT/OT  DVT PPx: Per primary  Will discuss plan w/ Dr. Maria and change accordingly.     Patient is a 58y Male who presents c/o intermittent lower back pain radiating down to right ankle x1 year, worse over the past 2 weeks after gardening. Community ambulator w/o assistance at baseline. Hx of Lsp Discectomy w/ Dr. Quintana in 2007 for Left Lsp Radic. Pt states his pain has been persistent for the past 2 weeks after gardening, worse w/ ambulation, no pain at rest. Pt has seen Dr. Quintana (Spine) for sx, and has been treated w/ conservative management. MRI scheduled yesterday, but was unable to go to MRI secondary to pain. Has been seen by Dr. Preston (Pain Management), received epidural injection on March 25th and April 15th which resolved sx, but sx returned 2 weeks ago. Pt has appointment w/ Dr. Quintana on June 1st to discuss further management. Pt notes his pain has significantly improved since given pain meds in ED. No Bowel/bladder incontinence. Does NOT feel off balance when walking. No Trauma or falls. Denies Numbness, weakness, CP, SOB, fever, chills or any other complaints.     HEALTH ISSUES - PROBLEM Dx:  Type 2 diabetes mellitus    HTN (hypertension)    HLD (hyperlipidemia)    Need for prophylactic measure    Acute lumbar radiculopathy    MEDICATIONS  (STANDING):  dextrose 5%. 1000 milliLiter(s) IV Continuous <Continuous>  dextrose 5%. 1000 milliLiter(s) IV Continuous <Continuous>  dextrose 50% Injectable 25 Gram(s) IV Push once  dextrose 50% Injectable 12.5 Gram(s) IV Push once  dextrose 50% Injectable 25 Gram(s) IV Push once  gabapentin 600 milliGRAM(s) Oral four times a day  glucagon  Injectable 1 milliGRAM(s) IntraMuscular once  hydrochlorothiazide 12.5 milliGRAM(s) Oral daily  insulin lispro (ADMELOG) corrective regimen sliding scale   SubCutaneous three times a day before meals  insulin lispro (ADMELOG) corrective regimen sliding scale   SubCutaneous at bedtime  lidocaine   4% Patch 1 Patch Transdermal <User Schedule>  losartan 100 milliGRAM(s) Oral daily  niacin  milliGRAM(s) Oral at bedtime  pantoprazole    Tablet 40 milliGRAM(s) Oral before breakfast  polyethylene glycol 3350 17 Gram(s) Oral two times a day  senna 2 Tablet(s) Oral at bedtime  simvastatin 40 milliGRAM(s) Oral at bedtime      Allergies    No Known Allergies    Intolerances        PAST MEDICAL & SURGICAL HISTORY:  Type 2 diabetes mellitus    HTN (hypertension)    HLD (hyperlipidemia)    Lumbar herniated disc    S/P diskectomy                              14.1   10.48 )-----------( 354      ( 23 May 2023 05:07 )             42.5       23 May 2023 05:07    135    |  102    |  31     ----------------------------<  208    4.9     |  27     |  1.20     Ca    10.1       23 May 2023 05:07    TPro  8.3    /  Alb  4.9    /  TBili  0.8    /  DBili  x      /  AST  38     /  ALT  49     /  AlkPhos  55     23 May 2023 05:07    Vital Signs Last 24 Hrs  T(C): 36.5 (05-22-23 @ 22:06), Max: 36.5 (05-22-23 @ 17:46)  T(F): 97.7 (05-22-23 @ 22:06), Max: 97.7 (05-22-23 @ 17:46)  HR: 62 (05-22-23 @ 22:06) (62 - 117)  BP: 103/71 (05-22-23 @ 22:06) (103/71 - 114/88)  BP(mean): --  RR: 15 (05-22-23 @ 22:06) (15 - 18)  SpO2: 100% (05-22-23 @ 22:06) (94% - 100%)    Physical Exam:   Gen: NAD  Spine PE:  Skin intact  No gross deformity  No midnline TTP C/T/L/S spine  No bony step offs  No paraspinal muscle ttp/hypertonicity   Negative clonus  Negative babinski  Negative rodriguez    Motor:                   C5                C6              C7               C8           T1   R            5/5                5/5            5/5             5/5          5/5  L             5/5               5/5             5/5             5/5          5/5                  L2             L3             L4               L5            S1  R         5/5           5/5          5/5             5/5           5/5  L          5/5          5/5           5/5             5/5           5/5    Sensory:            C5         C6         C7      C8       T1        (0=absent, 1=impaired, 2=normal, NT=not testable)  R        2          2              2        2         2  L         2          2              2        2         2               L2          L3         L4      L5       S1         (0=absent, 1=impaired, 2=normal, NT=not testable)  R          2          2              2        2         2  L          2          2              2        2         2    Imaging:   CT/MRI Pending.     A/P: 58y Male with Lsp Radiculopathy.     FU CT Lsp/MRI Lsp  Pain control prn  Recommend IV Decadron, Gabapentin, Flexeril  Recommend Dc on Medrol dose pack  WBAT with assistive devices as needed, PT/OT  DVT PPx: Per primary  Follow up w/ Dr. Quintana on June 1st for scheduled appointment.   If Unable to Follow up w/ Dr. Quintana, pt may follow up w/ Dr. Maria in office. Please call office for appointment.   Will discuss plan w/ Dr. Maria and change accordingly.     Patient is a 58y Male who presents c/o intermittent lower back pain radiating down to right ankle x1 year, worse over the past 2 weeks after gardening. Community ambulator w/o assistance at baseline. Hx of Lsp Discectomy w/ Dr. Quintana in 2007 for Left Lsp Radic. Pt states his pain has been persistent for the past 2 weeks after gardening, worse w/ ambulation, no pain at rest. Pt has seen Dr. Quintana (Spine) for sx, and has been treated w/ conservative management. MRI scheduled yesterday, but was unable to go to MRI secondary to pain. Has been seen by Dr. Preston (Pain Management), received epidural injection on March 25th and April 15th which resolved sx, but sx returned 2 weeks ago. Pt has appointment w/ Dr. Quintana on June 1st to discuss further management. Pt notes his pain has significantly improved since given pain meds in ED. No Bowel/bladder incontinence. Does NOT feel off balance when walking. No Trauma or falls. Denies Numbness, weakness, CP, SOB, fever, chills or any other complaints.     HEALTH ISSUES - PROBLEM Dx:  Type 2 diabetes mellitus    HTN (hypertension)    HLD (hyperlipidemia)    Need for prophylactic measure    Acute lumbar radiculopathy    MEDICATIONS  (STANDING):  dextrose 5%. 1000 milliLiter(s) IV Continuous <Continuous>  dextrose 5%. 1000 milliLiter(s) IV Continuous <Continuous>  dextrose 50% Injectable 25 Gram(s) IV Push once  dextrose 50% Injectable 12.5 Gram(s) IV Push once  dextrose 50% Injectable 25 Gram(s) IV Push once  gabapentin 600 milliGRAM(s) Oral four times a day  glucagon  Injectable 1 milliGRAM(s) IntraMuscular once  hydrochlorothiazide 12.5 milliGRAM(s) Oral daily  insulin lispro (ADMELOG) corrective regimen sliding scale   SubCutaneous three times a day before meals  insulin lispro (ADMELOG) corrective regimen sliding scale   SubCutaneous at bedtime  lidocaine   4% Patch 1 Patch Transdermal <User Schedule>  losartan 100 milliGRAM(s) Oral daily  niacin  milliGRAM(s) Oral at bedtime  pantoprazole    Tablet 40 milliGRAM(s) Oral before breakfast  polyethylene glycol 3350 17 Gram(s) Oral two times a day  senna 2 Tablet(s) Oral at bedtime  simvastatin 40 milliGRAM(s) Oral at bedtime      Allergies    No Known Allergies    Intolerances        PAST MEDICAL & SURGICAL HISTORY:  Type 2 diabetes mellitus    HTN (hypertension)    HLD (hyperlipidemia)    Lumbar herniated disc    S/P diskectomy                              14.1   10.48 )-----------( 354      ( 23 May 2023 05:07 )             42.5       23 May 2023 05:07    135    |  102    |  31     ----------------------------<  208    4.9     |  27     |  1.20     Ca    10.1       23 May 2023 05:07    TPro  8.3    /  Alb  4.9    /  TBili  0.8    /  DBili  x      /  AST  38     /  ALT  49     /  AlkPhos  55     23 May 2023 05:07    Vital Signs Last 24 Hrs  T(C): 36.5 (05-22-23 @ 22:06), Max: 36.5 (05-22-23 @ 17:46)  T(F): 97.7 (05-22-23 @ 22:06), Max: 97.7 (05-22-23 @ 17:46)  HR: 62 (05-22-23 @ 22:06) (62 - 117)  BP: 103/71 (05-22-23 @ 22:06) (103/71 - 114/88)  BP(mean): --  RR: 15 (05-22-23 @ 22:06) (15 - 18)  SpO2: 100% (05-22-23 @ 22:06) (94% - 100%)    Physical Exam:   Gen: NAD  Spine PE:  Skin intact  No gross deformity  No midnline TTP C/T/L/S spine  No bony step offs  No paraspinal muscle ttp/hypertonicity   Negative clonus  Negative babinski  Negative rodriguez    Motor:                   C5                C6              C7               C8           T1   R            5/5                5/5            5/5             5/5          5/5  L             5/5               5/5             5/5             5/5          5/5                  L2             L3             L4               L5            S1  R         5/5           5/5          5/5             5/5           5/5  L          5/5          5/5           5/5             5/5           5/5    Sensory:            C5         C6         C7      C8       T1        (0=absent, 1=impaired, 2=normal, NT=not testable)  R        2          2              2        2         2  L         2          2              2        2         2               L2          L3         L4      L5       S1         (0=absent, 1=impaired, 2=normal, NT=not testable)  R          2          2              2        2         2  L          2          2              2        2         2    Imaging:   MRI: Mild to moderate disc bulge L3-4. Abnormal soft tissue within the right   lateral recess extending cranially behind the L3 vertebral body into the   right L3-4 neural foramina. Favor extruded/sequestered disc fragment.   Fragment measures approximately 0.8 x 1.3 x 1.6 cm.  Prior left L5 laminectomy. Broad left paracentral/foraminal disc   protrusion L5-S1 with an associated osteophyte. Moderate facet DJD. Mass   effect on the left S1 nerve root within the left lateral recess. Moderate   to severe left neural foramina narrowing    A/P: 58y Male with Lsp Radiculopathy.     Imaging Reviewed.   Pain control prn  Recommend IV Decadron, Gabapentin, Flexeril  Recommend Dc on Medrol dose pack  WBAT with assistive devices as needed, PT/OT  DVT PPx: Per primary  Follow up w/ Dr. Quintana on June 1st for scheduled appointment.   If Unable to Follow up w/ Dr. Quintana, pt may follow up w/ Dr. Maria in office. Please call office for appointment.   Will discuss plan w/ Dr. Maria and change accordingly.     Patient is a 58y Male who presents c/o intermittent lower back pain radiating down to right ankle x1 year, worse over the past 2 weeks after gardening. Community ambulator w/o assistance at baseline. Hx of Lsp Discectomy w/ Dr. Quintana in 2007 for Left Lsp Radic. Pt states his pain has been persistent for the past 2 weeks after gardening, worse w/ ambulation, no pain at rest. Pt has seen Dr. Quintana (Spine) for sx, and has been treated w/ conservative management. MRI scheduled yesterday, but was unable to go to MRI secondary to pain. Has been seen by Dr. Preston (Pain Management), received epidural injection on March 25th and April 15th which resolved sx, but sx returned 2 weeks ago. Pt has appointment w/ Dr. Quintana on June 1st to discuss further management. Pt notes his pain has significantly improved since given pain meds in ED. No Bowel/bladder incontinence. Does NOT feel off balance when walking. No Trauma or falls. Denies Numbness, weakness, CP, SOB, fever, chills or any other complaints.     HEALTH ISSUES - PROBLEM Dx:  Type 2 diabetes mellitus    HTN (hypertension)    HLD (hyperlipidemia)    Need for prophylactic measure    Acute lumbar radiculopathy    MEDICATIONS  (STANDING):  dextrose 5%. 1000 milliLiter(s) IV Continuous <Continuous>  dextrose 5%. 1000 milliLiter(s) IV Continuous <Continuous>  dextrose 50% Injectable 25 Gram(s) IV Push once  dextrose 50% Injectable 12.5 Gram(s) IV Push once  dextrose 50% Injectable 25 Gram(s) IV Push once  gabapentin 600 milliGRAM(s) Oral four times a day  glucagon  Injectable 1 milliGRAM(s) IntraMuscular once  hydrochlorothiazide 12.5 milliGRAM(s) Oral daily  insulin lispro (ADMELOG) corrective regimen sliding scale   SubCutaneous three times a day before meals  insulin lispro (ADMELOG) corrective regimen sliding scale   SubCutaneous at bedtime  lidocaine   4% Patch 1 Patch Transdermal <User Schedule>  losartan 100 milliGRAM(s) Oral daily  niacin  milliGRAM(s) Oral at bedtime  pantoprazole    Tablet 40 milliGRAM(s) Oral before breakfast  polyethylene glycol 3350 17 Gram(s) Oral two times a day  senna 2 Tablet(s) Oral at bedtime  simvastatin 40 milliGRAM(s) Oral at bedtime      Allergies    No Known Allergies    Intolerances        PAST MEDICAL & SURGICAL HISTORY:  Type 2 diabetes mellitus    HTN (hypertension)    HLD (hyperlipidemia)    Lumbar herniated disc    S/P diskectomy                              14.1   10.48 )-----------( 354      ( 23 May 2023 05:07 )             42.5       23 May 2023 05:07    135    |  102    |  31     ----------------------------<  208    4.9     |  27     |  1.20     Ca    10.1       23 May 2023 05:07    TPro  8.3    /  Alb  4.9    /  TBili  0.8    /  DBili  x      /  AST  38     /  ALT  49     /  AlkPhos  55     23 May 2023 05:07    Vital Signs Last 24 Hrs  T(C): 36.5 (05-22-23 @ 22:06), Max: 36.5 (05-22-23 @ 17:46)  T(F): 97.7 (05-22-23 @ 22:06), Max: 97.7 (05-22-23 @ 17:46)  HR: 62 (05-22-23 @ 22:06) (62 - 117)  BP: 103/71 (05-22-23 @ 22:06) (103/71 - 114/88)  BP(mean): --  RR: 15 (05-22-23 @ 22:06) (15 - 18)  SpO2: 100% (05-22-23 @ 22:06) (94% - 100%)    Physical Exam:   Gen: NAD  Spine PE:  Skin intact  No gross deformity  No midnline TTP C/T/L/S spine  No bony step offs  No paraspinal muscle ttp/hypertonicity   Negative clonus  Negative babinski  Negative rodriguez    Motor:                   C5                C6              C7               C8           T1   R            5/5                5/5            5/5             5/5          5/5  L             5/5               5/5             5/5             5/5          5/5                  L2             L3             L4               L5            S1  R         5/5           5/5          5/5             5/5           5/5  L          5/5          5/5           5/5             5/5           5/5    Sensory:            C5         C6         C7      C8       T1        (0=absent, 1=impaired, 2=normal, NT=not testable)  R        2          2              2        2         2  L         2          2              2        2         2               L2          L3         L4      L5       S1         (0=absent, 1=impaired, 2=normal, NT=not testable)  R          2          2              2        2         2  L          2          2              2        2         2    Imaging:   MRI: Mild to moderate disc bulge L3-4. Abnormal soft tissue within the right   lateral recess extending cranially behind the L3 vertebral body into the   right L3-4 neural foramina. Favor extruded/sequestered disc fragment.   Fragment measures approximately 0.8 x 1.3 x 1.6 cm.  Prior left L5 laminectomy. Broad left paracentral/foraminal disc   protrusion L5-S1 with an associated osteophyte. Moderate facet DJD. Mass   effect on the left S1 nerve root within the left lateral recess. Moderate   to severe left neural foramina narrowing    A/P: 58y Male with Lsp Radiculopathy.     Imaging Reviewed.   Pain control prn  Recommend IV Decadron, Gabapentin, Flexeril  Recommend Dc on Medrol dose pack  WBAT with assistive devices as needed, PT/OT  DVT PPx: Per primary  Ortho stable for dc  No acute orthopedic surgical intervention  Follow up w/ Dr. Quintana on June 1st for scheduled appointment.   If Unable to Follow up w/ Dr. Quintana, pt may follow up w/ Dr. Maria in office. Please call office for appointment.   Will discuss plan w/ Dr. Maria and change accordingly.

## 2023-05-23 NOTE — ED ADULT NURSE REASSESSMENT NOTE - NS ED NURSE REASSESS COMMENT FT1
Assumed patient care, patient AOx3. Patient currently reports partial relief of pain rated 3/10, able to lay flat. Patient to fill out MRI checklist. Patient denies other complaints at this time.
Pt received on stretcher on left side, states his pain in that position to right leg is 6/10, walking is 10/10. Wife is at bedside, pending orders.

## 2023-05-23 NOTE — H&P ADULT - NSICDXPASTMEDICALHX_GEN_ALL_CORE_FT
PAST MEDICAL HISTORY:  HLD (hyperlipidemia)     HTN (hypertension)     Lumbar herniated disc     Type 2 diabetes mellitus

## 2023-05-23 NOTE — H&P ADULT - PROBLEM SELECTOR PLAN 2
Chronic  Continue hydrochlorithiazide, losartan as interchange for telmisartan Chronic  Continue hydrochlorthiazide, losartan as interchange for telmisartan

## 2023-05-23 NOTE — H&P ADULT - NSHPSOCIALHISTORY_GEN_ALL_CORE
Lives with wife at home  Ambulates independently  Denies alcohol or tobacco use  Received covid vaccine

## 2023-05-23 NOTE — H&P ADULT - ASSESSMENT
59yo M w/pmhx diabetes, hypertension, hyperlipidemia, herniated disc, presents from MRI office with severe back/buttocks pain radiating to right lower extremity past his knee. Admitted for pain management of lumbar radiculopathy.

## 2023-05-23 NOTE — PHYSICAL THERAPY INITIAL EVALUATION ADULT - GAIT TRAINING, PT EVAL
STG (3-5 sessions) Amb 50 feet without device vs least restrictive device safe for pt, supervision/independent

## 2023-05-23 NOTE — H&P ADULT - HISTORY OF PRESENT ILLNESS
57yo M w/pmhx diabetes, hypertension, hyperlipidemia, herniated disc, presents from MRI office with severe back pain radiating to right lower extremity past his knee.     ED Course:  Vitals: T 97.7, , /88, RR 18, spo2 94% on RA  Labs significant for: WBC 13.30, MCV 78.2, BUN 30, AST 61    Given Decadron 6mg IVP x1, Ketorolac 30mg IVP x1, lidocaine patch x1, morphine 4mg IVP x2, oxycodone 10mg PO x1 59yo M w/pmhx diabetes, hypertension, hyperlipidemia, herniated disc, presents from MRI office with severe back pain radiating to right lower extremity past his knee. Patient has been on gabapentin for several years due to herniated disc. Reports pain of his right buttocks that radiates down his right leg that began to worsen on May 1st. On May 13th he was gardening and since then his pain has been unbearable. He has taken tramadol, tizanidine, aspirin, tylenol, and his normal dose of gabapentin to try to control the pain, however he has been nearly bed bound the last 1.5 weeks, only getting up to go to the bathroom. This afternoon he was to go for an MRI for epidural placement, however he was unable to get it because of the pain and was sent to the ED. Pain while lying down on his right side is a 5-6/10, throbbing and tingling. If he moves, it is a sharp pain that radiates down and to his groin and becomes a 7-8. Reports he took some left over percocet a week ago, and he had difficulty urinating    ED Course:  Vitals: T 97.7, , /88, RR 18, spo2 94% on RA  Labs significant for: WBC 13.30, MCV 78.2, BUN 30, AST 61    Given Decadron 6mg IVP x1, Ketorolac 30mg IVP x1, lidocaine patch x1, morphine 4mg IVP x2, oxycodone 10mg PO x1, Dilaudid 1mg IVP x1, gabapentin 600mg x1 in the ED 59yo M w/pmhx diabetes, hypertension, hyperlipidemia, herniated disc L1-L5, presents from MRI office with severe back/buttocks pain radiating to right lower extremity past his knee. Patient has been on gabapentin for several years due to herniated disc. Reports pain of his right buttocks that radiates down his right leg that began to worsen on May 1st. On May 13th he was gardening and since then his pain has been unbearable. He has taken tramadol, tizanidine, aspirin, tylenol, and his normal dose of gabapentin to try to control the pain, however he has been nearly bed bound the last 1.5 weeks, only getting up to go to the bathroom. This afternoon he was to go for an MRI for epidural placement, however he was unable to get it because of the pain and was sent to the ED. Pain while lying down on his right side is a 5-6/10, throbbing and tingling. If he moves, it is a sharp pain that radiates down and to his groin and becomes a 7-8. Reports he took some left over percocet a week ago, and he had difficulty urinating that day, however after he stopped taking the percocet he urinated normally. Denies any loss of bowel or urinary function.    ED Course:  Vitals: T 97.7, , /88, RR 18, spo2 94% on RA  Labs significant for: WBC 13.30, MCV 78.2, BUN 30, AST 61    Given Decadron 6mg IVP x1, Ketorolac 30mg IVP x1, lidocaine patch x1, morphine 4mg IVP x2, oxycodone 10mg PO x1, Dilaudid 1mg IVP x1, gabapentin 600mg x1 in the ED 57yo M w/pmhx diabetes, hypertension, hyperlipidemia, herniated disc L1-L5, presents from MRI office with severe back/buttocks pain radiating to right lower extremity past his knee. Patient has been on gabapentin for several years due to herniated disc. Reports pain of his right buttocks that radiates down his right leg that began to worsen on May 1st. On May 13th he was gardening and since then his pain has been unbearable. He has taken tramadol, tizanidine, aspirin, tylenol, and his normal dose of gabapentin to try to control the pain, however he has been nearly bed bound the last 1.5 weeks, only getting up to go to the bathroom. This afternoon he was to go for an MRI for epidural placement, however he was unable to get it because of the pain and was sent to the ED. Pain while lying down on his right side is a 5-6/10, throbbing and tingling. If he moves, it is a sharp pain that radiates down, wrapping around his thigh and to his groin and becomes a 7-8. Reports he took some left over percocet a week ago, and he had difficulty urinating that day, however after he stopped taking the percocet he urinated normally. Denies any loss of bowel or urinary function.    ED Course:  Vitals: T 97.7, , /88, RR 18, spo2 94% on RA  Labs significant for: WBC 13.30, MCV 78.2, BUN 30, AST 61    Given Decadron 6mg IVP x1, Ketorolac 30mg IVP x1, lidocaine patch x1, morphine 4mg IVP x2, oxycodone 10mg PO x1, Dilaudid 1mg IVP x1, gabapentin 600mg x1 in the ED 59yo M w/pmhx diabetes, hypertension, hyperlipidemia, herniated disc L1-L5, presents from MRI office with severe back/buttocks pain radiating to right lower extremity past his knee. Patient has been on gabapentin for several years due to herniated disc. Reports pain of his right buttocks that radiates down his right leg that began to worsen on May 1st. On May 13th he was gardening and since then his pain has been unbearable. He has taken tramadol, tizanidine, aspirin, tylenol, and his normal dose of gabapentin to try to control the pain, however he has been nearly bed bound the last 1.5 weeks, only getting up to go to the bathroom. This afternoon he was to go for an MRI for epidural placement, however he was unable to get it because of the pain and was sent to the ED. Pain while lying down on his right side is a 5-6/10, throbbing and tingling. If he moves, it is a sharp pain that radiates down, wrapping around his thigh and to his groin and becomes a 7-8. Reports he took some left over percocet a week ago, and he had difficulty urinating that day, however after he stopped taking the percocet he urinated normally. Denies any loss of bowel or urinary function.    ED Course:  Vitals: T 97.7, , /88, RR 18, spo2 94% on RA  Labs significant for: WBC 13.30, MCV 78.2, BUN 30, AST 61  Given Decadron 6mg IVP x1, Ketorolac 30mg IVP x1, lidocaine patch x1, morphine 4mg IVP x2, oxycodone 10mg PO x1, Dilaudid 1mg IVP x1, gabapentin 600mg x1 in the ED

## 2023-05-23 NOTE — PATIENT PROFILE ADULT - FALL HARM RISK - HARM RISK INTERVENTIONS

## 2023-05-23 NOTE — PROGRESS NOTE ADULT - TIME BILLING
Reviewing medical record including consultant recommendations, labs, vitals, medications, orders, imaging, and discussion of plan of care with patient, family, consultants, and nursing.

## 2023-05-23 NOTE — H&P ADULT - PROBLEM SELECTOR PLAN 1
Pt presents with intractable pain of R buttocks radiating down R leg; known history of herniated disc L1-L5, s/p diskectomy  - S/p Decadron 6mg IVP x1, Ketorolac 30mg IVP x1, lidocaine patch x1, morphine 4mg IVP x2, oxycodone 10mg PO x1, Dilaudid 1mg IVP x1, gabapentin 600mg x1 in the ED  - Pain regimen: Tramadol 50mg PO q6h PRN for mild pain, hydromorphone 0.5mg IVP q6h PRN for moderate, hydromorphone 1.0mg IVP q6h PRN for severe; gabapentin 600mg q6h; lidocaine patch  - Bowel regimen  - F/u MRI spine  - Ortho consulted, f/u recs Pt presents with intractable pain of R buttocks radiating down R leg; known history of herniated disc L1-L5, s/p diskectomy  - S/p Decadron 6mg IVP x1, Ketorolac 30mg IVP x1, lidocaine patch x1, morphine 4mg IVP x2, oxycodone 10mg PO x1, Dilaudid 1mg IVP x1, gabapentin 600mg x1 in the ED  - Pain regimen: Tramadol 50mg PO q6h PRN for mild pain, hydromorphone 0.5mg IVP q6h PRN for moderate, hydromorphone 1.0mg IVP q6h PRN for severe; gabapentin 600mg q6h; lidocaine patch  - Bowel regimen  - F/u CT spine  - Ortho consulted, f/u recs Pt presents with intractable pain of R buttocks radiating down R leg; known history of herniated disc L1-L5, s/p diskectomy  - S/p Decadron 6mg IVP x1, Ketorolac 30mg IVP x1, lidocaine patch x1, morphine 4mg IVP x2, oxycodone 10mg PO x1, Dilaudid 1mg IVP x1, gabapentin 600mg x1 in the ED  - Pain regimen: Tylenol q6h PRN for mild pain, hydromorphone 0.5mg IVP q6h PRN for moderate, hydromorphone 1.0mg IVP q6h PRN for severe; gabapentin 600mg q6h; lidocaine patch  - Bowel regimen  - F/u CT spine  - Ortho consulted, f/u recs Pt presents with intractable pain of R buttocks radiating down R leg; known history of herniated disc L1-L5, s/p diskectomy  - S/p Decadron 6mg IVP x1, Ketorolac 30mg IVP x1, lidocaine patch x1, morphine 4mg IVP x2, oxycodone 10mg PO x1, Dilaudid 1mg IVP x1, gabapentin 600mg x1 in the ED  - Pain regimen: Tylenol q6h PRN for mild pain, hydromorphone 0.5mg IVP q6h PRN for moderate, hydromorphone 1.0mg IVP q6h PRN for severe; gabapentin 600mg q6h; lidocaine patch  - Bowel regimen  - F/u CT, MR lumbar spine  - Ortho consulted, f/u recs

## 2023-05-24 VITALS
DIASTOLIC BLOOD PRESSURE: 95 MMHG | SYSTOLIC BLOOD PRESSURE: 119 MMHG | OXYGEN SATURATION: 95 % | HEART RATE: 93 BPM | TEMPERATURE: 98 F | RESPIRATION RATE: 18 BRPM

## 2023-05-24 LAB
A1C WITH ESTIMATED AVERAGE GLUCOSE RESULT: 7.2 % — HIGH (ref 4–5.6)
ALBUMIN SERPL ELPH-MCNC: 4.4 G/DL — SIGNIFICANT CHANGE UP (ref 3.3–5)
ALP SERPL-CCNC: 50 U/L — SIGNIFICANT CHANGE UP (ref 40–120)
ALT FLD-CCNC: 65 U/L — SIGNIFICANT CHANGE UP (ref 12–78)
ANION GAP SERPL CALC-SCNC: 7 MMOL/L — SIGNIFICANT CHANGE UP (ref 5–17)
AST SERPL-CCNC: 70 U/L — HIGH (ref 15–37)
BASOPHILS # BLD AUTO: 0.08 K/UL — SIGNIFICANT CHANGE UP (ref 0–0.2)
BASOPHILS NFR BLD AUTO: 0.8 % — SIGNIFICANT CHANGE UP (ref 0–2)
BILIRUB SERPL-MCNC: 0.9 MG/DL — SIGNIFICANT CHANGE UP (ref 0.2–1.2)
BUN SERPL-MCNC: 29 MG/DL — HIGH (ref 7–23)
CALCIUM SERPL-MCNC: 9.7 MG/DL — SIGNIFICANT CHANGE UP (ref 8.5–10.1)
CHLORIDE SERPL-SCNC: 103 MMOL/L — SIGNIFICANT CHANGE UP (ref 96–108)
CO2 SERPL-SCNC: 28 MMOL/L — SIGNIFICANT CHANGE UP (ref 22–31)
CREAT SERPL-MCNC: 1.2 MG/DL — SIGNIFICANT CHANGE UP (ref 0.5–1.3)
EGFR: 70 ML/MIN/1.73M2 — SIGNIFICANT CHANGE UP
EOSINOPHIL # BLD AUTO: 0.24 K/UL — SIGNIFICANT CHANGE UP (ref 0–0.5)
EOSINOPHIL NFR BLD AUTO: 2.3 % — SIGNIFICANT CHANGE UP (ref 0–6)
ESTIMATED AVERAGE GLUCOSE: 160 MG/DL — HIGH (ref 68–114)
GLUCOSE SERPL-MCNC: 283 MG/DL — HIGH (ref 70–99)
HCT VFR BLD CALC: 42 % — SIGNIFICANT CHANGE UP (ref 39–50)
HCV AB S/CO SERPL IA: 0.08 S/CO — SIGNIFICANT CHANGE UP (ref 0–0.99)
HCV AB SERPL-IMP: SIGNIFICANT CHANGE UP
HGB BLD-MCNC: 13.9 G/DL — SIGNIFICANT CHANGE UP (ref 13–17)
IMM GRANULOCYTES NFR BLD AUTO: 0.3 % — SIGNIFICANT CHANGE UP (ref 0–0.9)
LYMPHOCYTES # BLD AUTO: 3.35 K/UL — HIGH (ref 1–3.3)
LYMPHOCYTES # BLD AUTO: 32.3 % — SIGNIFICANT CHANGE UP (ref 13–44)
MCHC RBC-ENTMCNC: 26.9 PG — LOW (ref 27–34)
MCHC RBC-ENTMCNC: 33.1 GM/DL — SIGNIFICANT CHANGE UP (ref 32–36)
MCV RBC AUTO: 81.4 FL — SIGNIFICANT CHANGE UP (ref 80–100)
MONOCYTES # BLD AUTO: 0.6 K/UL — SIGNIFICANT CHANGE UP (ref 0–0.9)
MONOCYTES NFR BLD AUTO: 5.8 % — SIGNIFICANT CHANGE UP (ref 2–14)
NEUTROPHILS # BLD AUTO: 6.08 K/UL — SIGNIFICANT CHANGE UP (ref 1.8–7.4)
NEUTROPHILS NFR BLD AUTO: 58.5 % — SIGNIFICANT CHANGE UP (ref 43–77)
NRBC # BLD: 0 /100 WBCS — SIGNIFICANT CHANGE UP (ref 0–0)
PLATELET # BLD AUTO: 333 K/UL — SIGNIFICANT CHANGE UP (ref 150–400)
POTASSIUM SERPL-MCNC: 4.3 MMOL/L — SIGNIFICANT CHANGE UP (ref 3.5–5.3)
POTASSIUM SERPL-SCNC: 4.3 MMOL/L — SIGNIFICANT CHANGE UP (ref 3.5–5.3)
PROT SERPL-MCNC: 7.6 G/DL — SIGNIFICANT CHANGE UP (ref 6–8.3)
RBC # BLD: 5.16 M/UL — SIGNIFICANT CHANGE UP (ref 4.2–5.8)
RBC # FLD: 14.2 % — SIGNIFICANT CHANGE UP (ref 10.3–14.5)
SODIUM SERPL-SCNC: 138 MMOL/L — SIGNIFICANT CHANGE UP (ref 135–145)
WBC # BLD: 10.38 K/UL — SIGNIFICANT CHANGE UP (ref 3.8–10.5)
WBC # FLD AUTO: 10.38 K/UL — SIGNIFICANT CHANGE UP (ref 3.8–10.5)

## 2023-05-24 PROCEDURE — 97161 PT EVAL LOW COMPLEX 20 MIN: CPT

## 2023-05-24 PROCEDURE — 72131 CT LUMBAR SPINE W/O DYE: CPT | Mod: MA

## 2023-05-24 PROCEDURE — 83036 HEMOGLOBIN GLYCOSYLATED A1C: CPT

## 2023-05-24 PROCEDURE — 36415 COLL VENOUS BLD VENIPUNCTURE: CPT

## 2023-05-24 PROCEDURE — 99285 EMERGENCY DEPT VISIT HI MDM: CPT

## 2023-05-24 PROCEDURE — 82962 GLUCOSE BLOOD TEST: CPT

## 2023-05-24 PROCEDURE — 96374 THER/PROPH/DIAG INJ IV PUSH: CPT

## 2023-05-24 PROCEDURE — 99239 HOSP IP/OBS DSCHRG MGMT >30: CPT | Mod: GC

## 2023-05-24 PROCEDURE — 86803 HEPATITIS C AB TEST: CPT

## 2023-05-24 PROCEDURE — 72148 MRI LUMBAR SPINE W/O DYE: CPT

## 2023-05-24 PROCEDURE — 97116 GAIT TRAINING THERAPY: CPT

## 2023-05-24 PROCEDURE — 82947 ASSAY GLUCOSE BLOOD QUANT: CPT

## 2023-05-24 PROCEDURE — 85027 COMPLETE CBC AUTOMATED: CPT

## 2023-05-24 PROCEDURE — 85025 COMPLETE CBC W/AUTO DIFF WBC: CPT

## 2023-05-24 PROCEDURE — 97530 THERAPEUTIC ACTIVITIES: CPT

## 2023-05-24 PROCEDURE — 80053 COMPREHEN METABOLIC PANEL: CPT

## 2023-05-24 RX ORDER — ENOXAPARIN SODIUM 100 MG/ML
40 INJECTION SUBCUTANEOUS EVERY 24 HOURS
Refills: 0 | Status: DISCONTINUED | OUTPATIENT
Start: 2023-05-24 | End: 2023-05-24

## 2023-05-24 RX ORDER — TRAMADOL HYDROCHLORIDE 50 MG/1
1 TABLET ORAL
Refills: 0 | DISCHARGE

## 2023-05-24 RX ORDER — EZETIMIBE 10 MG/1
10 TABLET ORAL DAILY
Refills: 0 | Status: DISCONTINUED | OUTPATIENT
Start: 2023-05-24 | End: 2023-05-24

## 2023-05-24 RX ORDER — POLYETHYLENE GLYCOL 3350 17 G/17G
17 POWDER, FOR SOLUTION ORAL
Qty: 0 | Refills: 0 | DISCHARGE
Start: 2023-05-24

## 2023-05-24 RX ORDER — OXYCODONE HYDROCHLORIDE 5 MG/1
1 TABLET ORAL
Qty: 20 | Refills: 0
Start: 2023-05-24 | End: 2023-06-02

## 2023-05-24 RX ORDER — LIDOCAINE 4 G/100G
1 CREAM TOPICAL ONCE
Refills: 0 | Status: COMPLETED | OUTPATIENT
Start: 2023-05-24 | End: 2023-05-24

## 2023-05-24 RX ORDER — OXYCODONE HYDROCHLORIDE 5 MG/1
10 TABLET ORAL ONCE
Refills: 0 | Status: DISCONTINUED | OUTPATIENT
Start: 2023-05-24 | End: 2023-05-24

## 2023-05-24 RX ORDER — LIDOCAINE 4 G/100G
1 CREAM TOPICAL
Qty: 1 | Refills: 0
Start: 2023-05-24 | End: 2023-05-28

## 2023-05-24 RX ORDER — LOSARTAN POTASSIUM 100 MG/1
1 TABLET, FILM COATED ORAL
Qty: 0 | Refills: 0 | DISCHARGE
Start: 2023-05-24

## 2023-05-24 RX ORDER — OXYCODONE HYDROCHLORIDE 5 MG/1
1 TABLET ORAL
Qty: 28 | Refills: 0
Start: 2023-05-24 | End: 2023-06-06

## 2023-05-24 RX ORDER — DICLOFENAC SODIUM 75 MG/1
1 TABLET, DELAYED RELEASE ORAL
Qty: 28 | Refills: 0
Start: 2023-05-24 | End: 2023-06-06

## 2023-05-24 RX ORDER — SENNA PLUS 8.6 MG/1
2 TABLET ORAL
Qty: 0 | Refills: 0 | DISCHARGE
Start: 2023-05-24

## 2023-05-24 RX ADMIN — GABAPENTIN 600 MILLIGRAM(S): 400 CAPSULE ORAL at 05:16

## 2023-05-24 RX ADMIN — LIDOCAINE 1 PATCH: 4 CREAM TOPICAL at 17:18

## 2023-05-24 RX ADMIN — GABAPENTIN 600 MILLIGRAM(S): 400 CAPSULE ORAL at 00:01

## 2023-05-24 RX ADMIN — ENOXAPARIN SODIUM 40 MILLIGRAM(S): 100 INJECTION SUBCUTANEOUS at 11:33

## 2023-05-24 RX ADMIN — GABAPENTIN 600 MILLIGRAM(S): 400 CAPSULE ORAL at 17:48

## 2023-05-24 RX ADMIN — Medication 1: at 08:14

## 2023-05-24 RX ADMIN — EZETIMIBE 10 MILLIGRAM(S): 10 TABLET ORAL at 11:34

## 2023-05-24 RX ADMIN — Medication 1: at 12:28

## 2023-05-24 RX ADMIN — OXYCODONE HYDROCHLORIDE 10 MILLIGRAM(S): 5 TABLET ORAL at 15:46

## 2023-05-24 RX ADMIN — Medication 650 MILLIGRAM(S): at 15:21

## 2023-05-24 RX ADMIN — POLYETHYLENE GLYCOL 3350 17 GRAM(S): 17 POWDER, FOR SOLUTION ORAL at 05:16

## 2023-05-24 RX ADMIN — PANTOPRAZOLE SODIUM 40 MILLIGRAM(S): 20 TABLET, DELAYED RELEASE ORAL at 05:16

## 2023-05-24 RX ADMIN — Medication 1: at 17:48

## 2023-05-24 RX ADMIN — Medication 650 MILLIGRAM(S): at 15:51

## 2023-05-24 RX ADMIN — POLYETHYLENE GLYCOL 3350 17 GRAM(S): 17 POWDER, FOR SOLUTION ORAL at 17:51

## 2023-05-24 RX ADMIN — GABAPENTIN 600 MILLIGRAM(S): 400 CAPSULE ORAL at 11:34

## 2023-05-24 NOTE — DISCHARGE NOTE NURSING/CASE MANAGEMENT/SOCIAL WORK - NSDCPEFALRISK_GEN_ALL_CORE
For information on Fall & Injury Prevention, visit: [email or web address] OR  [email or web address] OR  https://www.cdc.gov/steadi/patient.html

## 2023-05-24 NOTE — PROGRESS NOTE ADULT - PROBLEM SELECTOR PLAN 2
Chronic  Continue hydrochlorthiazide, losartan as interchange for telmisartan
Chronic  Continue hydrochlorthiazide, losartan as interchange for telmisartan

## 2023-05-24 NOTE — PROGRESS NOTE ADULT - PROBLEM SELECTOR PLAN 4
Chronic, glucose WNL on admission  - Consistent carb diet   - Hold home po diabetic medications  - Start low dose correctional insulin scale    - Goal blood glucose of 140-180, adjust insulin regimen PRN  - Hypoglycemia protocol in place, monitor for signs of hypoglycemia
Chronic, glucose WNL on admission  - Consistent carb diet   - Hold home po diabetic medications  - Start low dose correctional insulin scale    - Goal blood glucose of 140-180, adjust insulin regimen PRN  - Hypoglycemia protocol in place, monitor for signs of hypoglycemia

## 2023-05-24 NOTE — PROGRESS NOTE ADULT - ATTENDING COMMENTS
The patient was seen and examined on the day of discharge by the attending physician. Pt stable for discharge. Please see discharge note for additional information regarding the hospital course and the day of discharge.     57yo M w/pmhx diabetes, hypertension, hyperlipidemia, herniated disc, presents from MRI office with severe back/buttocks pain radiating to right lower extremity past his knee. Admitted for pain management of lumbar radiculopathy    #lumbar radiculopathy  #HTN  #HLD  #type 2 diabetes mellitus    - pain rregimen adjusted - our team spoke w/ outpatient pain management doc   - standing tylenol 1g tid standing  - will continue gabapentin 600 milliGRAM(s) Oral four times a day for neuropathy  - will continue hydrochlorothiazide 12.5 milliGRAM(s) Oral daily for HTN  - will continue losartan 100 milliGRAM(s) Oral daily for HTN  - insulin lispro while admitted  - will continue lidocaine   4% Patch 1 Patch Transdermal <User Schedule> for pain  - will continue pantoprazole    Tablet 40 milliGRAM(s) Oral before breakfast for gerd  - will continue simvastatin 40 milliGRAM(s) Oral at bedtime for HLD  - will continue ezetimibe 10 milliGRAM(s) Oral daily for HLD  - will continue niacin  milliGRAM(s) Oral at bedtime for HLD  - gi ppx - ppi (home dosing)  - vte ppx - lovenox  - diet - diabetic diet  - code status - reg, carb controlled, dash  - dispo - dc home today  - updated spouse at bedside  - plan for outpatient pain management follow up for lumbar injection.

## 2023-05-24 NOTE — CHART NOTE - NSCHARTNOTEFT_GEN_A_CORE
The patient was seen and examined on the day of discharge by the attending physician. Pt stable for discharge. Please see discharge note for additional information regarding the hospital course and the day of discharge.     59yo M w/pmhx diabetes, hypertension, hyperlipidemia, herniated disc, presents from MRI office with severe back/buttocks pain radiating to right lower extremity past his knee. Admitted for pain management of lumbar radiculopathy    #lumbar radiculopathy  #HTN  #HLD  #type 2 diabetes mellitus    - pain rregimen adjusted - our team spoke w/ outpatient pain management doc   - standing tylenol 1g tid standing  - will continue gabapentin 600 milliGRAM(s) Oral four times a day for neuropathy  - will continue hydrochlorothiazide 12.5 milliGRAM(s) Oral daily for HTN  - will continue losartan 100 milliGRAM(s) Oral daily for HTN  - insulin lispro while admitted  - will continue lidocaine   4% Patch 1 Patch Transdermal <User Schedule> for pain  - will continue pantoprazole    Tablet 40 milliGRAM(s) Oral before breakfast for gerd  - will continue simvastatin 40 milliGRAM(s) Oral at bedtime for HLD  - will continue ezetimibe 10 milliGRAM(s) Oral daily for HLD  - will continue niacin  milliGRAM(s) Oral at bedtime for HLD  - gi ppx - ppi (home dosing)  - vte ppx - lovenox  - diet - diabetic diet  - code status - reg, carb controlled, dash  - dispo - dc home today  - updated spouse at bedside  - plan for outpatient pain management follow up for lumbar injection

## 2023-05-24 NOTE — DISCHARGE NOTE PROVIDER - CARE PROVIDER_API CALL
Gamal Ryan ()  Pain Medicine; PhysicalRehab Medicine  Gamal Ryan DO, [address] [place] [address]  Phone: [phone]  Fax: [phone]  Established Patient  Follow Up Time: 1 week    Tom Quintana  Orthopaedic Surgery  [address] [place] [address]  Phone: [phone]  Fax: [phone]  Established Patient  Follow Up Time: 1 week    jasmine huerta  [address]   [phone]  Phone: (   )    -  Fax: (   )    -  Established Patient  Follow Up Time: 1 week

## 2023-05-24 NOTE — DISCHARGE NOTE PROVIDER - NSDCCPCAREPLAN_GEN_ALL_CORE_FT
PRINCIPAL DISCHARGE DIAGNOSIS  Diagnosis: Acute lumbar radiculopathy  Assessment and Plan of Treatment: You were admitted for pain management of Lumbar radiculopathy/ Low back Pain/ Lumbar disc extrusion at L3/4.  Imaging was done, that shows MR lumbar spine, Mild to moderate disc bulge L3-4. Abnormal soft tissue within the right lateral recess extending cranially behind the L3 vertebral body into the right L3-4 neural foramina. PM&R , ortho, Physical therapy was consulted.   PLEASE FOLLOW Recommendations:  --Please take  Oxycodone 10 mg 1 tablet  twice daily and as needed for 1-2 weeks for exacerbation of pain.   - Please take Gabapentin 600 mg 1 tablet every  6 hours for radicular symptoms.  - please take Diclofenac 50 mg 1 tablet twice daily for 1-2 weeks if cleared by Dr. Preston as patient scheduled to undergo lumbar epidural this upcoming Friday 5/26   - Please follow up with your appointment for lumber steroid injection , it will benefit you.  Lumbar epidural steroid injection at L3/4 right paramedian approach.  If no significant relief, he should  Recommend PT program as above.   Avoid steroids at this time given history of diabetes and patient scheduled to undergo steroid injection on Friday.  Ortho consulted, Ortho stable for dc. No acute orthopedic surgical intervention  Follow up w/ Dr. Quintana on June 1st for scheduled appointment.  If Unable to Follow up w/ Dr. Quintana, pt may follow up w/ Dr. Maria in office. Please call office for appointment.     PRINCIPAL DISCHARGE DIAGNOSIS  Diagnosis: Acute lumbar radiculopathy  Assessment and Plan of Treatment: You were admitted for pain management of Lumbar radiculopathy/ Low back Pain/ Lumbar disc extrusion at L3/4.  Imaging was done, that shows MR lumbar spine, Mild to moderate disc bulge L3-4. Abnormal soft tissue within the right lateral recess extending cranially behind the L3 vertebral body into the right L3-4 neural foramina. PM&R , ortho, Physical therapy was consulted.   PLEASE FOLLOW Recommendations:  --Please take  Oxycodone and amitriptyline as prescribed by Dr. Preston  - Please take Gabapentin 600 mg 1 tablet every 8 hours for radicular symptoms.  - please take Diclofenac 50 mg 1 tablet twice daily for 1-2 weeks   - Please follow up with your appointment for lumber steroid injection with Dr. Preston , it will benefit you.  Lumbar epidural steroid injection at L3/4 right paramedian approach.  If no significant relief, he should  Recommend PT program as above.   Avoid steroids at this time given history of diabetes and patient scheduled to undergo steroid injection on Friday.  Ortho consulted, Ortho stable for dc. No acute orthopedic surgical intervention  Follow up w/ Dr. Quintana on June 1st for scheduled appointment.  If Unable to Follow up w/ Dr. Quintana, pt may follow up w/ Dr. Maria in office. Please call office for appointment.     PRINCIPAL DISCHARGE DIAGNOSIS  Diagnosis: Acute lumbar radiculopathy  Assessment and Plan of Treatment: You were admitted for pain management of Lumbar radiculopathy/ Low back Pain/ Lumbar disc extrusion at L3/4.  Imaging was done, that shows MR lumbar spine, Mild to moderate disc bulge L3-4. Abnormal soft tissue within the right lateral recess extending cranially behind the L3 vertebral body into the right L3-4 neural foramina. PM&R , ortho, Physical therapy was consulted.   PLEASE FOLLOW Recommendations:  --Please take  Oxycodone and amitriptyline as prescribed by Dr. Preston (rx was also sent for 10mg oxycodone by Dr. Overton)  - Please take Gabapentin 600 mg 1 tablet every 8 hours for radicular symptoms.  - please take Diclofenac 50 mg 1 tablet twice daily for 1-2 weeks   - acetaminophen 1000mg three times per day for the next 10 days   - Please follow up with your appointment for lumber steroid injection with Dr. Preston , it will benefit you.  Lumbar epidural steroid injection at L3/4 right paramedian approach.  If no significant relief, he should  Recommend PT program as above.   Avoid steroids at this time given history of diabetes and patient scheduled to undergo steroid injection on Friday.  Ortho consulted, Ortho stable for dc. No acute orthopedic surgical intervention  Follow up w/ Dr. Quintana on June 1st for scheduled appointment.  If Unable to Follow up w/ Dr. Quintana, pt may follow up w/ Dr. Maria in office. Please call office for appointment.

## 2023-05-24 NOTE — PROGRESS NOTE ADULT - PROBLEM SELECTOR PLAN 1
Pt presents with intractable pain of R buttocks radiating down R leg; known history of herniated disc L1-L5, s/p diskectomy  - S/p Decadron 6mg IVP x1, Ketorolac 30mg IVP x1, lidocaine patch x1, morphine 4mg IVP x2, oxycodone 10mg PO x1, Dilaudid 1mg IVP x1, gabapentin 600mg x1 in the ED  - Pain regimen: Tylenol q6h PRN for mild pain, hydromorphone 0.5mg IVP q6h PRN for moderate, hydromorphone 1.0mg IVP q6h PRN for severe; gabapentin 600mg q6h; lidocaine patch  - Bowel regimen  - MR lumbar spine, Mild to moderate disc bulge L3-4. Abnormal soft tissue within the right   lateral recess extending cranially behind the L3 vertebral body into the   right L3-4 neural foramina.   - Ortho consult noted. Recommend IV Decadron, Gabapentin, Flexeril and Dc on Medrol dose pack  - Had PT today.  - Physiatrist consult requested, Dr. Gamal Ryan .
Pt presents with intractable pain of R buttocks radiating down R leg; known history of herniated disc L1-L5, s/p diskectomy  MR lumbar spine, Mild to moderate disc bulge L3-4. Abnormal soft tissue within the right   lateral recess extending cranially behind the L3 vertebral body into the   right L3-4 neural foramina.   - S/p Decadron 6mg IVP x1, Ketorolac 30mg IVP x1, lidocaine patch x1, morphine 4mg IVP x2, oxycodone 10mg PO x1, Dilaudid 1mg IVP x1, gabapentin 600mg x1 in the ED  - Pain regimen per physiatry recommendations  - Oxycodone 10mg BID and diclofenac 50mg BID as needed on discharge  - Discussed dc plan with patient's ortho Dr. KLINE who is in agreement with plan  - Bowel regimen  - Ortho consult noted. Recommend IV Decadron, Gabapentin, Flexeril and Dc on Medrol dose pack  - PT rec outpatient PT  - Physiatrist following

## 2023-05-24 NOTE — CARE COORDINATION ASSESSMENT. - NSCAREPROVIDERS_GEN_ALL_CORE_FT
CARE PROVIDERS:  Accepting Physician: Chani Tain  Administration: Alvaro Da Silva  Administration: Durga Ernst  Admitting: Chani Tian  Attending: Zay Overton  Case Management: Cleveland Laurent  Consultant: Weil, Patricia  Consultant: Gamal Ryan  Covering Team: Ambar Meadows  ED Attending: Ene Durán  ED Nurse: Juan Carlos Feliciano  HIM/Billing & Coding: Sharda Esparza  Nurse: Perez Rothman  Ordered: Doctor, Unknown  Ordered: ADM, User  Override: Kayy Salas  Override: Erna De Los Santos  PCA/Nursing Assistant: Joann Gonzalez  Physical Therapy: Kori Barnett  Primary Team: Omar Vences  Primary Team: Chani Tian  Primary Team: Char Gamboa  Primary Team: Adela Hawk  Primary Team: Fatmata Salgado  Primary Team: Zay Overton  Registered Dietitian: Kaylin Thomas  Respiratory Therapy: Alexia Trujillo  : Ambar Sellers

## 2023-05-24 NOTE — DISCHARGE NOTE PROVIDER - PROVIDER TOKENS
PROVIDER:[TOKEN:[[id number]],FOLLOWUP:[1 week],ESTABLISHEDPATIENT:[T]],PROVIDER:[TOKEN:[[id number]],FOLLOWUP:[1 week],ESTABLISHEDPATIENT:[T]],FREE:[LAST:[tessono],FIRST:[jasmine],PHONE:[(   )    -],FAX:[(   )    -],ADDRESS:[[address] [place] [address]   [phone]],FOLLOWUP:[1 week],ESTABLISHEDPATIENT:[T]]

## 2023-05-24 NOTE — CARE COORDINATION ASSESSMENT. - ASSESSMENT CONCERNS TO BE ADDRESSED
Pt and spouse made aware of PT recommendations and are aware that MD will provide a presciption for outpt PT prior to discharge.  Pt states he has had outpt PT in the past and has a vendor./discharge planning

## 2023-05-24 NOTE — PROGRESS NOTE ADULT - ASSESSMENT
59yo M w/pmhx diabetes, hypertension, hyperlipidemia, herniated disc, presents from MRI office with severe back/buttocks pain radiating to right lower extremity past his knee. Admitted for pain management of lumbar radiculopathy.
57yo M w/pmhx diabetes, hypertension, hyperlipidemia, herniated disc, presents from MRI office with severe back/buttocks pain radiating to right lower extremity past his knee. Admitted for pain management of lumbar radiculopathy.

## 2023-05-24 NOTE — DISCHARGE NOTE PROVIDER - HOSPITAL COURSE
HPI:  57yo M w/pmhx diabetes, hypertension, hyperlipidemia, herniated disc L1-L5, presents from MRI office with severe back/buttocks pain radiating to right lower extremity past his knee. Patient has been on gabapentin for several years due to herniated disc. Reports pain of his right buttocks that radiates down his right leg that began to worsen on May 1st. On May 13th he was gardening and since then his pain has been unbearable. He has taken tramadol, tizanidine, aspirin, tylenol, and his normal dose of gabapentin to try to control the pain, however he has been nearly bed bound the last 1.5 weeks, only getting up to go to the bathroom. This afternoon he was to go for an MRI for epidural placement, however he was unable to get it because of the pain and was sent to the ED. Pain while lying down on his right side is a 5-6/10, throbbing and tingling. If he moves, it is a sharp pain that radiates down, wrapping around his thigh and to his groin and becomes a 7-8. Reports he took some left over percocet a week ago, and he had difficulty urinating that day, however after he stopped taking the percocet he urinated normally. Denies any loss of bowel or urinary function.    ED Course:  Vitals: T 97.7, , /88, RR 18, spo2 94% on RA  Labs significant for: WBC 13.30, MCV 78.2, BUN 30, AST 61  Given Decadron 6mg IVP x1, Ketorolac 30mg IVP x1, lidocaine patch x1, morphine 4mg IVP x2, oxycodone 10mg PO x1, Dilaudid 1mg IVP x1, gabapentin 600mg x1 in the ED (23 May 2023 01:32)      ---  HOSPITAL COURSE: Patient admitted for pain management of Lumbar radiculopathy/ Low back Pain/ Lumbar disc extrusion at L3/4.  MR lumbar spine, Mild to moderate disc bulge L3-4. Abnormal soft tissue within the right lateral recess extending cranially behind the L3 vertebral body into the right L3-4 neural foramina. PM&R , ortho , PT consulted. Recommendation are:   Tylenol 1000 mg q 8 hours PRN for mild pain (1-3)  Dilaudid 0.5 mg q 6 hours PRN for moderate pain (4-6)  Dilaudid 1 mg q 6 hours PRN for severe pain (7-10)  Upon discharge would recommend Oxycodone 10 mg BID PRN for 1-2 weeks for exacerbation of pain.   Gabapentin 600 mg q 6 hours for radicular symptoms.  Would consider Diclofenac 50 mg BID (creatinine 1.2) for 1-2 weeks if cleared by Dr. Preston as patient scheduled to undergo lumbar epidural this upcoming Friday  Patient will call Dr. Preston for clearance of NSAID  Patient would most likely benefit from Lumbar epidural steroid injection at L3/4 right paramedian approach.  If no significant relief, he should follow up with his surgeon.    Recommend PT program as above.   Avoid steroids at this time given history of diabetes and patient scheduled to undergo steroid injection on Friday.  Ortho consulted, Ortho stable for dc. No acute orthopedic surgical intervention  Follow up w/ Dr. Quintana on June 1st for scheduled appointment.  If Unable to Follow up w/ Dr. Quintana, pt may follow up w/ Dr. Maria in office. Please call office for appointment.     PT recommendation out patient PT on discharge     Care plan reviewed with patient and caregivers. Patient in agreement and endorse understanding. Pt deemed stable for d/c home w/ anticipatory guidance and strict indications for return. No outstanding issues or concerns noted.     Patient is stable to discharge home. Patient is medically optimized by primary care team and consultants to discharge home with f/u with out patient PCP/ PM&R /Ortho as mentioned above.   ---  VITALS ON THE DAY OF DISCHARGE   Vital Signs Last 24 Hrs  T(C): 36.7 (24 May 2023 05:17), Max: 36.7 (23 May 2023 20:26)  T(F): 98.1 (24 May 2023 05:17), Max: 98.1 (24 May 2023 05:17)  HR: 92 (24 May 2023 05:17) (92 - 106)  BP: 109/66 (24 May 2023 05:17) (109/66 - 127/86)  BP(mean): --  RR: 18 (24 May 2023 05:17) (16 - 18)  SpO2: 94% (24 May 2023 05:17) (92% - 95%)    Parameters below as of 24 May 2023 05:17  Patient On (Oxygen Delivery Method): room air    PHYSICAL EXAM ON THE DAY OF DISCHARGE:   GENERAL: NAD  HEENT:  EOMI, moist mucous membranes  CHEST/LUNG:  CTA b/l, no rales, wheezes, or rhonchi  HEART:  RRR, S1, S2  ABDOMEN:  BS+, soft, nontender, nondistended  MSK EXAM: Palpation:  SIJ tenderness is negative, there is muscle spasm and tenderness to palpation across lumbar paraspinals on right, decreased ROM of lumbar spine secondary to pain, motor exam 5/5 throughout LE, sensation is diminished to L3/4 on right,  DTR’s= symmetric in LE  Special Tests:  Straight leg raising test is positive on right. negative on left. ,Berhane/TORRES maneuver is negative, Negative Facet loading, Clonus negative     NERVOUS SYSTEM: AA&Ox3    CONSULTANTS:   Physiatrist  Dr. Gamal Ryan .  ORTHO Dr quintana / gualberto   ---  TIME SPENT:  I, the attending physician, was physically present for the key portions of the evaluation and management (E/M) service provided. The total amount of time spent reviewing the hospital notes, laboratory values, imaging findings, assessing/counseling the patient, discussing with consultant physicians, social work, nursing staff was -- minutes    ---  Primary care provider was made aware of plan for discharge:      [  ] NO     [  ] YES   Statement Selected HPI:  59yo M w/pmhx diabetes, hypertension, hyperlipidemia, herniated disc L1-L5, presents from MRI office with severe back/buttocks pain radiating to right lower extremity past his knee. Patient has been on gabapentin for several years due to herniated disc. Reports pain of his right buttocks that radiates down his right leg that began to worsen on May 1st.       ---  HOSPITAL COURSE:   Patient admitted for pain management of Lumbar radiculopathy/ Low back Pain/ Lumbar disc extrusion at L3/4.  MR lumbar spine, Mild to moderate disc bulge L3-4. Abnormal soft tissue within the right lateral recess extending cranially behind the L3 vertebral body into the right L3-4 neural foramina. PM&R , ortho , PT consulted. Recommendation are:   Tylenol 1000 mg q 8 hours PRN for mild pain (1-3)  Dilaudid 0.5 mg q 6 hours PRN for moderate pain (4-6)  Dilaudid 1 mg q 6 hours PRN for severe pain (7-10)  Upon discharge would recommend Oxycodone 10 mg BID PRN for 1-2 weeks for exacerbation of pain.   Gabapentin 600 mg q 6 hours for radicular symptoms.  Would consider Diclofenac 50 mg BID (creatinine 1.2) for 1-2 weeks if cleared by Dr. Preston as patient scheduled to undergo lumbar epidural this upcoming Friday  Patient will call Dr. Preston for clearance of NSAID  Patient would most likely benefit from Lumbar epidural steroid injection at L3/4 right paramedian approach.  If no significant relief, he should follow up with his surgeon.    Recommend PT program as above.   Avoid steroids at this time given history of diabetes and patient scheduled to undergo steroid injection on Friday.  Ortho consulted, Ortho stable for dc. No acute orthopedic surgical intervention  Follow up w/ Dr. Quintana on June 1st for scheduled appointment.  If Unable to Follow up w/ Dr. Quintana, pt may follow up w/ Dr. Maria in office. Please call office for appointment.     PT recommendation out patient PT on discharge     Care plan reviewed with patient and caregivers. Patient in agreement and endorse understanding. Pt deemed stable for d/c home w/ anticipatory guidance and strict indications for return. No outstanding issues or concerns noted.     Patient is stable to discharge home. Patient is medically optimized by primary care team and consultants to discharge home with f/u with out patient PCP/ PM&R /Ortho as mentioned above.   ---  VITALS ON THE DAY OF DISCHARGE   Vital Signs Last 24 Hrs  T(C): 36.7 (24 May 2023 05:17), Max: 36.7 (23 May 2023 20:26)  T(F): 98.1 (24 May 2023 05:17), Max: 98.1 (24 May 2023 05:17)  HR: 92 (24 May 2023 05:17) (92 - 106)  BP: 109/66 (24 May 2023 05:17) (109/66 - 127/86)  BP(mean): --  RR: 18 (24 May 2023 05:17) (16 - 18)  SpO2: 94% (24 May 2023 05:17) (92% - 95%)    Parameters below as of 24 May 2023 05:17  Patient On (Oxygen Delivery Method): room air    PHYSICAL EXAM ON THE DAY OF DISCHARGE:   GENERAL: NAD  HEENT:  EOMI, moist mucous membranes  CHEST/LUNG:  CTA b/l, no rales, wheezes, or rhonchi  HEART:  RRR, S1, S2  ABDOMEN:  BS+, soft, nontender, nondistended  MSK EXAM: Palpation:  SIJ tenderness is negative, there is muscle spasm and tenderness to palpation across lumbar paraspinals on right, decreased ROM of lumbar spine secondary to pain, motor exam 5/5 throughout LE, sensation is diminished to L3/4 on right,  DTR’s= symmetric in LE  Special Tests:  Straight leg raising test is positive on right. negative on left. ,Berhane/TORRES maneuver is negative, Negative Facet loading, Clonus negative     NERVOUS SYSTEM: AA&Ox3    CONSULTANTS:   Physiatrist  Dr. Gamal Ryan .  ORTHO Dr quintana / gualberto HPI:  59yo M w/pmhx diabetes, hypertension, hyperlipidemia, herniated disc L1-L5, presents from MRI office with severe back/buttocks pain radiating to right lower extremity past his knee. Patient has been on gabapentin for several years due to herniated disc. Reports pain of his right buttocks that radiates down his right leg that began to worsen on May 1st.       ---  HOSPITAL COURSE:   Patient admitted for pain management of Lumbar radiculopathy/ Low back Pain/ Lumbar disc extrusion at L3/4.  MR lumbar spine, Mild to moderate disc bulge L3-4. Abnormal soft tissue within the right lateral recess extending cranially behind the L3 vertebral body into the right L3-4 neural foramina. PM&R , ortho , PT consulted. Recommendation are:   Tylenol 1000 mg q 8 hours PRN for mild pain (1-3)  Dilaudid 0.5 mg q 6 hours PRN for moderate pain (4-6)  Dilaudid 1 mg q 6 hours PRN for severe pain (7-10)  Upon discharge would recommend Oxycodone 10 mg BID PRN for 1-2 weeks for exacerbation of pain.   Gabapentin 600 mg q 6 hours for radicular symptoms.  Would consider Diclofenac 50 mg BID (creatinine 1.2) for 1-2 weeks if cleared by Dr. Presotn as patient scheduled to undergo lumbar epidural this upcoming Friday  Patient will call Dr. Preston for clearance of NSAID  Patient would most likely benefit from Lumbar epidural steroid injection at L3/4 right paramedian approach.  If no significant relief, he should follow up with his surgeon.    Recommend PT program as above.   Avoid steroids at this time given history of diabetes and patient scheduled to undergo steroid injection on Friday.  Ortho consulted, Ortho stable for dc. No acute orthopedic surgical intervention  Follow up w/ Dr. Quintana on June 1st for scheduled appointment.  If Unable to Follow up w/ Dr. Quintana, pt may follow up w/ Dr. Maria in office. Please call office for appointment.     PT recommendation out patient PT on discharge     Care plan reviewed with patient and caregivers. Patient in agreement and endorse understanding. Pt deemed stable for d/c home w/ anticipatory guidance and strict indications for return. No outstanding issues or concerns noted.     Patient is stable to discharge home. Patient is medically optimized by primary care team and consultants to discharge home with f/u with out patient PCP/ PM&R /Ortho as mentioned above.   ---  VITALS ON THE DAY OF DISCHARGE   Vital Signs Last 24 Hrs  T(C): 36.7 (24 May 2023 05:17), Max: 36.7 (23 May 2023 20:26)  T(F): 98.1 (24 May 2023 05:17), Max: 98.1 (24 May 2023 05:17)  HR: 92 (24 May 2023 05:17) (92 - 106)  BP: 109/66 (24 May 2023 05:17) (109/66 - 127/86)  BP(mean): --  RR: 18 (24 May 2023 05:17) (16 - 18)  SpO2: 94% (24 May 2023 05:17) (92% - 95%)    Parameters below as of 24 May 2023 05:17  Patient On (Oxygen Delivery Method): room air    PHYSICAL EXAM ON THE DAY OF DISCHARGE:   GENERAL: NAD  HEENT:  EOMI, moist mucous membranes  CHEST/LUNG:  CTA b/l, no rales, wheezes, or rhonchi  HEART:  RRR, S1, S2  ABDOMEN:  BS+, soft, nontender, nondistended  MSK EXAM: Palpation:  SIJ tenderness is negative, there is muscle spasm and tenderness to palpation across lumbar paraspinals on right, decreased ROM of lumbar spine secondary to pain, motor exam 5/5 throughout LE, sensation is diminished to L3/4 on right,  DTR’s= symmetric in LE  Special Tests:  Straight leg raising test is positive on right. negative on left. ,Berhane/TORRES maneuver is negative, Negative Facet loading, Clonus negative     NERVOUS SYSTEM: AA&Ox3    CONSULTANTS:   Physiatrist  Dr. Gamal Ryan    ORTHO Dr quintana / gualberto

## 2023-05-24 NOTE — CARE COORDINATION ASSESSMENT. - OTHER PERTINENT DISCHARGE PLANNING INFORMATION:
Pt presented with c/o back pain radiating down his leg.  given decadron IV x1 in ED, admitted for pain management.  He states he was not receiving skilled services PTA.

## 2023-05-24 NOTE — CARE COORDINATION ASSESSMENT. - NSPASTMEDSURGHISTORY_GEN_ALL_CORE_FT
PAST MEDICAL & SURGICAL HISTORY:  Lumbar herniated disc      HLD (hyperlipidemia)      HTN (hypertension)      Type 2 diabetes mellitus      S/P diskectomy

## 2023-05-24 NOTE — DISCHARGE NOTE PROVIDER - NSDCMRMEDTOKEN_GEN_ALL_CORE_FT
ezetimibe 10 mg oral tablet: 1 orally once a day  gabapentin 600 mg oral tablet: 1 orally 4 times a day  Janumet 50 mg-1000 mg oral tablet: 1 orally 2 times a day  losartan 100 mg oral tablet: 1 tab(s) orally once a day  niacin 500 mg oral capsule: 1 orally once a day  polyethylene glycol 3350 oral powder for reconstitution: 17 gram(s) orally 2 times a day  Prevacid 15 mg oral delayed release capsule: 1 orally  senna leaf extract oral tablet: 2 tab(s) orally once a day (at bedtime)  simvastatin 40 mg oral tablet: 1 orally once a day (at bedtime)  telmisartan-hydrochlorothiazide 80 mg-12.5 mg oral tablet: 1 orally once a day  tiZANidine 4 mg oral capsule: 1 orally once a day (at bedtime)  traMADol 50 mg oral tablet: 1 orally 4 times a day  Vascepa 1 g oral capsule: 2 orally 2 times a day   ezetimibe 10 mg oral tablet: 1 orally once a day  gabapentin 600 mg oral tablet: 1 orally 4 times a day  Janumet 50 mg-1000 mg oral tablet: 1 orally 2 times a day  losartan 100 mg oral tablet: 1 tab(s) orally once a day  niacin 500 mg oral capsule: 1 orally once a day  polyethylene glycol 3350 oral powder for reconstitution: 17 gram(s) orally 2 times a day  Prevacid 15 mg oral delayed release capsule: 1 orally  senna leaf extract oral tablet: 2 tab(s) orally once a day (at bedtime)  simvastatin 40 mg oral tablet: 1 orally once a day (at bedtime)  telmisartan-hydrochlorothiazide 80 mg-12.5 mg oral tablet: 1 orally once a day  tiZANidine 4 mg oral capsule: 1 orally once a day (at bedtime)  Vascepa 1 g oral capsule: 2 orally 2 times a day   diclofenac potassium 50 mg oral tablet: 1 tab(s) orally 2 times a day as needed for pain  ezetimibe 10 mg oral tablet: 1 orally once a day  gabapentin 600 mg oral tablet: 1 orally 4 times a day  Janumet 50 mg-1000 mg oral tablet: 1 orally 2 times a day  losartan 100 mg oral tablet: 1 tab(s) orally once a day  niacin 500 mg oral capsule: 1 orally once a day  oxyCODONE 10 mg oral tablet: 1 tab(s) orally 2 times a day as needed for  moderate pain MDD: 20  polyethylene glycol 3350 oral powder for reconstitution: 17 gram(s) orally 2 times a day  Prevacid 15 mg oral delayed release capsule: 1 orally  senna leaf extract oral tablet: 2 tab(s) orally once a day (at bedtime)  simvastatin 40 mg oral tablet: 1 orally once a day (at bedtime)  telmisartan-hydrochlorothiazide 80 mg-12.5 mg oral tablet: 1 orally once a day  tiZANidine 4 mg oral capsule: 1 orally once a day (at bedtime)  Vascepa 1 g oral capsule: 2 orally 2 times a day   Asperflex 4% topical film: Apply topically to affected area once a day  diclofenac potassium 50 mg oral tablet: 1 tab(s) orally 2 times a day as needed for pain  ezetimibe 10 mg oral tablet: 1 orally once a day  gabapentin 600 mg oral tablet: 1 orally 4 times a day  Janumet 50 mg-1000 mg oral tablet: 1 orally 2 times a day  losartan 100 mg oral tablet: 1 tab(s) orally once a day  niacin 500 mg oral capsule: 1 orally once a day  outpatient physical therapy 2-3x / week x4-6wks for back pain and lumbar herniated disc: gait instability R26.89  oxyCODONE 10 mg oral tablet: 1 tab(s) orally 2 times a day as needed for  moderate pain MDD: 20  polyethylene glycol 3350 oral powder for reconstitution: 17 gram(s) orally 2 times a day  Prevacid 15 mg oral delayed release capsule: 1 orally  senna leaf extract oral tablet: 2 tab(s) orally once a day (at bedtime)  simvastatin 40 mg oral tablet: 1 orally once a day (at bedtime)  telmisartan-hydrochlorothiazide 80 mg-12.5 mg oral tablet: 1 orally once a day  tiZANidine 4 mg oral capsule: 1 orally once a day (at bedtime)  Vascepa 1 g oral capsule: 2 orally 2 times a day

## 2023-05-24 NOTE — DISCHARGE NOTE NURSING/CASE MANAGEMENT/SOCIAL WORK - PATIENT PORTAL LINK FT
You can access the FollowMyHealth Patient Portal offered by [facility] by registering at the following website: [email or web address]. By joining [facility]’s FollowMyHealth portal, you will also be able to view your health information using other applications (apps) compatible with our system.

## 2024-07-30 NOTE — CARE COORDINATION ASSESSMENT. - REASON FOR CONSULT
Postural Assessment:   WFL    GAIT: I Mod I S SBA CGA Min Mod Max Total  NT x2 Comments:   Level of Assistance [x] [] [] [] [] [] [] [] [] [] []    Weightbearing Status       Distance  200 feet    Gait Quality Antalgic    DME None     Stairs      Ramp     I=Independent, Mod I=Modified Independent, S=Supervision, SBA=Standby Assistance, CGA=Contact Guard Assistance,   Min=Minimal Assistance, Mod=Moderate Assistance, Max=Maximal Assistance, Total=Total Assistance, NT=Not Tested    TREATMENT:   EVALUATION: LOW COMPLEXITY: (Untimed Charge)  The initial evaluation charge encompasses clinical chart review, objective assessment, interpretation of assessment, and skilled monitoring of the patient's response to treatment in order to develop a plan of care.     TREATMENT PLAN:   Effective Dates: 7/30/2024 TO 7/30/2024.    Treatment/Session Assessment:  Patient was instructed in PT- HEP to increase strength and ROM in LEs.  Answered all questions.  Frequency/Duration: Patient to continue to perform home exercise program at least twice per day up until her surgery.    EDUCATION: Education Given To: Patient  Education Provided: Role of Therapy, Plan of Care, Home Exercise Program  Education Method: Verbal  Education Outcome: Verbalized understanding    MEDICAL NECESSITY: Ms. Tao is expected to optimize herlower extremity strength and ROM in preparation for joint replacement surgery.    REASON FOR CONTINUED SERVICES: Achieve baseline assesment of musculoskeletal system, functional mobility and home environment., educate in PT HEP in preparation for surgery, educate in hospital plan of care.    COMPLIANCE WITH PROGRAM/EXERCISE: Will assess as treatment progresses.    TOTAL TREATMENT DURATION:  Time In: 0915  Time Out: 0930  Minutes: 15    Regarding Paola Tao's therapy, I certify that the treatment plan above will be carried out by a therapist or under their direction.  Thank you for this referral,  Marline TADEO  Consult noted for stairs in home, pt states has a cane/ walker at home, but is generally an independent ambulator.  He navigates the stairs slowly.